# Patient Record
Sex: FEMALE | Race: WHITE | Employment: PART TIME | URBAN - METROPOLITAN AREA
[De-identification: names, ages, dates, MRNs, and addresses within clinical notes are randomized per-mention and may not be internally consistent; named-entity substitution may affect disease eponyms.]

---

## 2017-04-19 ENCOUNTER — HOSPITAL ENCOUNTER (OUTPATIENT)
Dept: PHYSICAL THERAPY | Age: 57
Discharge: HOME | End: 2017-04-19
Payer: COMMERCIAL

## 2017-04-19 DIAGNOSIS — M53.3 COCCYDYNIA: ICD-10-CM

## 2017-04-19 PROCEDURE — 97140 MANUAL THERAPY 1/> REGIONS: CPT

## 2017-04-19 PROCEDURE — 97161 PT EVAL LOW COMPLEX 20 MIN: CPT

## 2017-04-19 NOTE — PROGRESS NOTES
Ambulatory/Rehab Services H2 Model Falls Risk Assessment    Risk Factor Pts. ·   Confusion/Disorientation/Impulsivity  []    4 ·   Symptomatic Depression  []   2 ·   Altered Elimination  []   1 ·   Dizziness/Vertigo  []   1 ·   Gender (Male)  []   1 ·   Any administered antiepileptics (anticonvulsants):  []   2 ·   Any administered benzodiazepines:  []   1 ·   Visual Impairment (specify):  []   1 ·   Portable Oxygen Use  []   1 ·   Orthostatic ? BP  []   1 ·   History of Recent Falls (within 3 mos.)  []   5     Ability to Rise from Chair (choose one) Pts. ·   Ability to rise in a single movement  [x]   0 ·   Pushes up, successful in one attempt  []   1 ·   Multiple attempts, but successful  []   3 ·   Unable to rise without assistance  []   4   Total: (5 or greater = High Risk) 0     Falls Prevention Plan:   []                Physical Limitations to Exercise (specify):   []                Mobility Assistance Device (type):   []                Exercise/Equipment Adaptation (specify):    ©2010 [facility] of [place] Patent #[id number].  Federal Law prohibits the replication, distribution or use without written permission from [facility] [employer]

## 2017-04-19 NOTE — PROGRESS NOTES
Kvng Mendosa  : 1960 [facility] [place] [address], [place] [address].  [phone]   [phone]          OUTPATIENT PHYSICAL THERAPY:Initial Assessment 2017    ICD-10: Treatment Diagnosis: Sacrococcygeal disorders, not elsewhere classified Coccygodynia (M53.3)  Precautions/Allergies:   Review of patient's allergies indicates no known allergies. Fall Risk Score: 0 (? 5 = High Risk)  MD Orders: Eval and treat MEDICAL/REFERRING DIAGNOSIS:  Coccydynia [M53.3]   DATE OF ONSET: 10 months ago  REFERRING PHYSICIAN: Tray Gandhi MD  RETURN PHYSICIAN APPOINTMENT: TBD     INITIAL ASSESSMENT:  Ms. Aamir Diaz presents decreased ROM of right hip, increased tone on right hip and coccygeus, and deviated coccyx that may be contributing to patients coccyx pain. Pt would benefit from physical therapy to address these deficits and improving sitting, traveling, and sleeping. PROBLEM LIST (Impacting functional limitations):  1. Decreased Strength  2. Decreased ADL/Functional Activities  3. Decreased Transfer Abilities  4. Increased Pain  5. Decreased [place] with Home Exercise Program INTERVENTIONS PLANNED:  1. Neuromuscular re-education  2. Biofeedback as needed  3. HEP  4. Electrical Stimulation  5. Home Exercise Program (HEP)  6. Manual Therapy  7. Range of Motion (ROM)  8. Therapeutic Activites   9. Therapeutic Exercise   TREATMENT PLAN:  Effective Dates: 2017 TO 2017. Frequency/Duration: 1 time a week for 12 weeks  GOALS: (Goals have been discussed and agreed upon with patient.)  Short-Term Functional Goals: Time Frame: in 3 weeks  1. Patient will demonstrate independence with home exercise program.  2. Pt will increase right hip ER 5 degrees. Discharge Goals: Time Frame: in 8 weeks  1. Pt will report no pain at tailbone with transition from sit to stand.    2. Pt will report ability to sit for 1 hour in order to drive long distances. 3.  Pt will improve Modified Oswestry low back pain questionnaire to less than 3/50. Rehabilitation Potential For Stated Goals: Good  Regarding Andree Hooks's therapy, I certify that the treatment plan above will be carried out by a therapist or under their direction. Thank you for this referral,  Araceli Cruz PT     Referring Physician Signature: Carol Bowman MD              Date                    The information in this section was collected on 4/19/2017 (except where otherwise noted). HISTORY:   Present Symptoms:  Pain Intensity 1: 0 Pain = 2/10. Pain located at tailbone. History of Present Injury/Illness (Reason for Referral):  10 months ago was riding on a horse and had increased pain at the tailbone. Pain with sitting is about 3/10. Transition from sit to stand = 4/10. Has to change positions to improved sitting. Past Medical History/Comorbidities:   Ms. Loida Cast  has a past medical history of Anxiety and Headache. Ms. Loida Cast  has a past surgical history that includes hysterectomy. Social History/Living Environment:     Lives at home with . 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 Current Medications:    Current Outpatient Prescriptions:     estradiol (CLIMARA) 0.0375 mg/24 hr, 1 Patch by TransDERmal route Every Saturday. , Disp: 12 Patch, Rfl: 3    amitriptyline (ELAVIL) 25 mg tablet, Take 3 Tabs by mouth nightly., Disp: 90 Tab, Rfl: 11    citalopram (CELEXA) 20 mg tablet, Take 1 Tab by mouth daily. , Disp: 90 Tab, Rfl: 3    omega-3 fatty acids-vitamin e (FISH OIL) 1,000 mg cap, Take 1 capsule by mouth., Disp: , Rfl:     multivitamin (ONE A DAY) tablet, Take 1 tablet by mouth daily. , Disp: , Rfl:     Calcium Carbonate-Vit D3-Min (CALTRATE 600+D PLUS MINERALS) 600 mg calcium- 400 unit tab, Take  by mouth., Disp: , Rfl:    Date Last Reviewed:  4/19/2017  Bowel Habits:  Patient demonstrates normal bowel habits.   Mobility / Self Care: I        Number of Personal Factors/Comorbidities that affect the Plan of Care: 1-2: MODERATE COMPLEXITY   EXAMINATION:   Lumbar ROM:   All full without pain  Sacrum:  Sacral torsion to the left  Coccyx:  deviated to right  Right hip : limited with external rotation:  ER = 30 degrees        · Palpation:   Right Left   coccyx Pain  Pain    Coccygeus Increased tone    piriformis Increased tone    Psoas           Body Structures Involved:  1. Muscles  2. Ligaments Body Functions Affected:  1. Neuromusculoskeletal Activities and Participation Affected:  1. General Tasks and Demands  2. Domestic Life   Number of elements that affect the Plan of Care: 3: MODERATE COMPLEXITY   CLINICAL PRESENTATION:   Presentation: Stable and uncomplicated: LOW COMPLEXITY   CLINICAL DECISION MAKING:   Outcome Measure: Tool Used: Modified Oswestry Low Back Pain Questionnaire  Score:  Initial: 7/50  Most Recent: X/50 (Date: -- )   Interpretation of Score: Each section is scored on a 0-5 scale, 5 representing the greatest disability. The scores of each section are added together for a total score of 50. Score 0 1-10 11-20 21-30 31-40 41-49 50   Modifier CH CI CJ CK CL CM CN     Medical Necessity:   · Patient demonstrates good rehab potential due to higher previous functional level. · Skilled intervention continues to be required due to pain at coccyx imprairing ablitiy to sit. .  Reason for Services/Other Comments:  · Patient continues to require skilled intervention due to pain at coccyx imprairing ablitiy to sit. .   Use of outcome tool(s) and clinical judgement create a POC that gives a: Clear prediction of patient's progress: LOW COMPLEXITY   TREATMENT:   (In addition to Assessment/Re-Assessment sessions the following treatments were rendered)  Pre-treatment Symptoms/Complaints:  Coccyx pain in sitting and transitioning from sit to stand  Pain: Initial:   Pain Intensity 1: 0 0/10 Post Session:  0/10       THERAPEUTIC EXERCISE: (10 minutes):  Exercises per grid below to improve mobility, strength and coordination. Required minimal visual, verbal and tactile cues to promote proper body alignment and promote proper body breathing techniques. Progressed resistance, repetitions and complexity of movement as indicated. MANUAL THERAPY: (10  minutes): Joint mobilization and Soft tissue mobilization was utilized and necessary because of the patient's restricted joint motion and restricted motion of soft tissue. Soft tissue theile massage to bilateral coccygeus  Grade 2 mobilization of coccyx into flexion  McConnel taping for coccyx flexion    Exercises:  Patient instructed in pelvic floor exercises listed below:   Date:  4/19/2017 Date:   Date:     Activity/Exercise Parameters Parameters Parameters   Pt education 5 min      Sit to stand X 8     Piriformis stretch X 3 x 30 sec                                 The following educational topics were reviewed with patient:  Bladder health, tips to control urge, bladder diary, pelvic floor anatomy, how foods affect bladder, bladder retraining. Treatment/Session Assessment:    · Response to Treatment:   Pt reported good understanding of plan of care as well as exercises. All questions were answered and pt. Invited to call with any further questions or issues. No pain after treatment. · Compliance with Program/Exercises Will assess as treatment progresses. · Recommendations/Intent for next treatment session: \"Next visit will focus on advancements to more challenging activities\".   Total Treatment Duration:  PT Patient Time In/Time Out  Time In: 1100  Time Out: 1200    Syl Schaeffer PT

## 2017-04-25 ENCOUNTER — HOSPITAL ENCOUNTER (OUTPATIENT)
Dept: PHYSICAL THERAPY | Age: 57
Discharge: HOME | End: 2017-04-25
Payer: COMMERCIAL

## 2017-04-25 PROCEDURE — 97110 THERAPEUTIC EXERCISES: CPT

## 2017-04-25 PROCEDURE — 97140 MANUAL THERAPY 1/> REGIONS: CPT

## 2017-04-25 NOTE — PROGRESS NOTES
Fran Jacobs  : 1960 [facility] [place] [address], [address] [facility]  [phone]   [phone]          OUTPATIENT PHYSICAL THERAPY:Daily Note 2017    ICD-10: Treatment Diagnosis: Sacrococcygeal disorders, not elsewhere classified Coccygodynia (M53.3)  Precautions/Allergies:   Review of patient's allergies indicates no known allergies. Fall Risk Score: 0 (? 5 = High Risk)  MD Orders: Eval and treat MEDICAL/REFERRING DIAGNOSIS:  Coccydynia [M53.3]   DATE OF ONSET: 10 months ago  REFERRING PHYSICIAN: Daniel Wilson MD  RETURN PHYSICIAN APPOINTMENT: TBD     INITIAL ASSESSMENT:  Ms. Gina Arizmendi presents decreased ROM of right hip, increased tone on right hip and coccygeus, and deviated coccyx that may be contributing to patients coccyx pain. Pt would benefit from physical therapy to address these deficits and improving sitting, traveling, and sleeping. PROBLEM LIST (Impacting functional limitations):  1. Decreased Strength  2. Decreased ADL/Functional Activities  3. Decreased Transfer Abilities  4. Increased Pain  5. Decreased [place] with Home Exercise Program INTERVENTIONS PLANNED:  1. Neuromuscular re-education  2. Biofeedback as needed  3. HEP  4. Electrical Stimulation  5. Home Exercise Program (HEP)  6. Manual Therapy  7. Range of Motion (ROM)  8. Therapeutic Activites   9. Therapeutic Exercise   TREATMENT PLAN:  Effective Dates: 2017 TO 2017. Frequency/Duration: 1 time a week for 12 weeks  GOALS: (Goals have been discussed and agreed upon with patient.)  Short-Term Functional Goals: Time Frame: in 3 weeks  1. Patient will demonstrate independence with home exercise program.  2. Pt will increase right hip ER 5 degrees. Discharge Goals: Time Frame: in 8 weeks  1. Pt will report no pain at tailbone with transition from sit to stand. 2. Pt will report ability to sit for 1 hour in order to drive long distances.   3.  Pt will improve Modified Oswestry low back pain questionnaire to less than 3/50. Rehabilitation Potential For Stated Goals: Good  Regarding Andree Hooks's therapy, I certify that the treatment plan above will be carried out by a therapist or under their direction. Thank you for this referral,  Pastor Jhaveri PT     Referring Physician Signature: Kim Campbell MD              Date                    The information in this section was collected on 4/19/2017 (except where otherwise noted). HISTORY:   Present Symptoms:  Pain Intensity 1: 0 Pain = 2/10. Pain located at tailbone. History of Present Injury/Illness (Reason for Referral):  10 months ago was riding on a horse and had increased pain at the tailbone. Pain with sitting is about 3/10. Transition from sit to stand = 4/10. Has to change positions to improved sitting. Past Medical History/Comorbidities:   Ms. Andree Epstein  has a past medical history of Anxiety and Headache. Ms. Andree Epstein  has a past surgical history that includes hysterectomy. Social History/Living Environment:     Lives at home with . 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 Current Medications:    Current Outpatient Prescriptions:     estradiol (CLIMARA) 0.0375 mg/24 hr, 1 Patch by TransDERmal route Every Saturday. , Disp: 12 Patch, Rfl: 3    amitriptyline (ELAVIL) 25 mg tablet, Take 3 Tabs by mouth nightly., Disp: 90 Tab, Rfl: 11    citalopram (CELEXA) 20 mg tablet, Take 1 Tab by mouth daily. , Disp: 90 Tab, Rfl: 3    omega-3 fatty acids-vitamin e (FISH OIL) 1,000 mg cap, Take 1 capsule by mouth., Disp: , Rfl:     multivitamin (ONE A DAY) tablet, Take 1 tablet by mouth daily. , Disp: , Rfl:     Calcium Carbonate-Vit D3-Min (CALTRATE 600+D PLUS MINERALS) 600 mg calcium- 400 unit tab, Take  by mouth., Disp: , Rfl:    Date Last Reviewed:  4/25/2017  Bowel Habits:  Patient demonstrates normal bowel habits. Mobility / Self Care:  I Number of Personal Factors/Comorbidities that affect the Plan of Care: 1-2: MODERATE COMPLEXITY   EXAMINATION:   Lumbar ROM:   All full without pain  Sacrum:  Sacral torsion to the left  Coccyx:  deviated to right  Right hip : limited with external rotation:  ER = 30 degrees        · Palpation:   Right Left   coccyx Pain  Pain    Coccygeus Increased tone    piriformis Increased tone    Psoas           Body Structures Involved:  1. Muscles  2. Ligaments Body Functions Affected:  1. Neuromusculoskeletal Activities and Participation Affected:  1. General Tasks and Demands  2. Domestic Life   Number of elements that affect the Plan of Care: 3: MODERATE COMPLEXITY   CLINICAL PRESENTATION:   Presentation: Stable and uncomplicated: LOW COMPLEXITY   CLINICAL DECISION MAKING:   Outcome Measure: Tool Used: Modified Oswestry Low Back Pain Questionnaire  Score:  Initial: 7/50  Most Recent: X/50 (Date: -- )   Interpretation of Score: Each section is scored on a 0-5 scale, 5 representing the greatest disability. The scores of each section are added together for a total score of 50. Score 0 1-10 11-20 21-30 31-40 41-49 50   Modifier CH CI CJ CK CL CM CN     Medical Necessity:   · Patient demonstrates good rehab potential due to higher previous functional level. · Skilled intervention continues to be required due to pain at coccyx imprairing ablitiy to sit. .  Reason for Services/Other Comments:  · Patient continues to require skilled intervention due to pain at coccyx imprairing ablitiy to sit. .   Use of outcome tool(s) and clinical judgement create a POC that gives a: Clear prediction of patient's progress: LOW COMPLEXITY   TREATMENT:   (In addition to Assessment/Re-Assessment sessions the following treatments were rendered)  Pre-treatment Symptoms/Complaints:  Has noticed improvements with going from sit to stand. Coccyx is about the same.    Pain: Initial:   Pain Intensity 1: 0 0/10 Post Session:  0/10       THERAPEUTIC EXERCISE: (10 minutes):  Exercises per grid below to improve mobility, strength and coordination. Required minimal visual, verbal and tactile cues to promote proper body alignment and promote proper body breathing techniques. Progressed resistance, repetitions and complexity of movement as indicated. MANUAL THERAPY: (40 minutes): Joint mobilization and Soft tissue mobilization was utilized and necessary because of the patient's restricted joint motion and restricted motion of soft tissue. Soft tissue theile massage to bilateral coccygeus  Grade 2 mobilization of coccyx into flexion  McConnel taping for coccyx flexion    Exercises:  Patient instructed in pelvic floor exercises listed below:   Date:  4/19/2017 Date:  4/25/2017   Activity/Exercise Parameters Parameters   Pt education 5 min     Sit to stand X 8    Piriformis stretch X 3 x 30 sec X 3 x 30 sec   Prone heel squeezes  X 8 x 10 sec hold   Single leg extension in prone  X 10 B   Clam shell  X 10 B            The following educational topics were reviewed with patient:    Treatment/Session Assessment:  Pt did not report increased pain after treatment today. Did well with all exercises,   · Response to Treatment:  Good  · Compliance with Program/Exercises Will assess as treatment progresses. · Recommendations/Intent for next treatment session: \"Next visit will focus on advancements to more challenging activities\".   Total Treatment Duration:  PT Patient Time In/Time Out  Time In: 1000  Time Out: [address], PT                 \

## 2017-05-09 ENCOUNTER — HOSPITAL ENCOUNTER (OUTPATIENT)
Dept: PHYSICAL THERAPY | Age: 57
Discharge: HOME | End: 2017-05-09
Payer: COMMERCIAL

## 2017-05-09 PROCEDURE — 97140 MANUAL THERAPY 1/> REGIONS: CPT

## 2017-05-09 PROCEDURE — 97110 THERAPEUTIC EXERCISES: CPT

## 2017-05-09 NOTE — PROGRESS NOTES
Maral Lewis  : 1960 [facility]  [address], [address] [place] [address], [address] [facility]  [phone]   [phone]          OUTPATIENT PHYSICAL THERAPY:Daily Note 2017    ICD-10: Treatment Diagnosis: Sacrococcygeal disorders, not elsewhere classified Coccygodynia (M53.3)  Precautions/Allergies:   Review of patient's allergies indicates no known allergies. Fall Risk Score: 0 (? 5 = High Risk)  MD Orders: Eval and treat MEDICAL/REFERRING DIAGNOSIS:  Coccydynia [M53.3]   DATE OF ONSET: 10 months ago  REFERRING PHYSICIAN: Robbin Moody MD  RETURN PHYSICIAN APPOINTMENT: TBD     INITIAL ASSESSMENT:  Ms. Zafar Bo presents decreased ROM of right hip, increased tone on right hip and coccygeus, and deviated coccyx that may be contributing to patients coccyx pain. Pt would benefit from physical therapy to address these deficits and improving sitting, traveling, and sleeping. PROBLEM LIST (Impacting functional limitations):  1. Decreased Strength  2. Decreased ADL/Functional Activities  3. Decreased Transfer Abilities  4. Increased Pain  5. Decreased [place] with Home Exercise Program INTERVENTIONS PLANNED:  1. Neuromuscular re-education  2. Biofeedback as needed  3. HEP  4. Electrical Stimulation  5. Home Exercise Program (HEP)  6. Manual Therapy  7. Range of Motion (ROM)  8. Therapeutic Activites   9. Therapeutic Exercise   TREATMENT PLAN:  Effective Dates: 2017 TO 2017. Frequency/Duration: 1 time a week for 12 weeks  GOALS: (Goals have been discussed and agreed upon with patient.)  Short-Term Functional Goals: Time Frame: in 3 weeks  1. Patient will demonstrate independence with home exercise program.  2. Pt will increase right hip ER 5 degrees. Discharge Goals: Time Frame: in 8 weeks  1. Pt will report no pain at tailbone with transition from sit to stand. 2. Pt will report ability to sit for 1 hour in order to drive long distances.   3.  Pt will improve Modified Oswestry low back pain questionnaire to less than 3/50. Rehabilitation Potential For Stated Goals: Good  Regarding Andree Hooks's therapy, I certify that the treatment plan above will be carried out by a therapist or under their direction. Thank you for this referral,  Lexus Schaefer PT     Referring Physician Signature: Jasmin Wang MD              Date                    The information in this section was collected on 4/19/2017 (except where otherwise noted). HISTORY:   Present Symptoms:  Pain Intensity 1: 0 Pain = 2/10. Pain located at tailbone. History of Present Injury/Illness (Reason for Referral):  10 months ago was riding on a horse and had increased pain at the tailbone. Pain with sitting is about 3/10. Transition from sit to stand = 4/10. Has to change positions to improved sitting. Past Medical History/Comorbidities:   Ms. Mayra Stanley  has a past medical history of Anxiety and Headache. Ms. Mayra Stanley  has a past surgical history that includes hysterectomy. Social History/Living Environment:     Lives at home with . 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 Current Medications:    Current Outpatient Prescriptions:     estradiol (CLIMARA) 0.0375 mg/24 hr, 1 Patch by TransDERmal route Every Saturday. , Disp: 12 Patch, Rfl: 3    amitriptyline (ELAVIL) 25 mg tablet, Take 3 Tabs by mouth nightly., Disp: 90 Tab, Rfl: 11    citalopram (CELEXA) 20 mg tablet, Take 1 Tab by mouth daily. , Disp: 90 Tab, Rfl: 3    omega-3 fatty acids-vitamin e (FISH OIL) 1,000 mg cap, Take 1 capsule by mouth., Disp: , Rfl:     multivitamin (ONE A DAY) tablet, Take 1 tablet by mouth daily. , Disp: , Rfl:     Calcium Carbonate-Vit D3-Min (CALTRATE 600+D PLUS MINERALS) 600 mg calcium- 400 unit tab, Take  by mouth., Disp: , Rfl:    Date Last Reviewed:  5/9/2017  Bowel Habits:  Patient demonstrates normal bowel habits. Mobility / Self Care:  I Number of Personal Factors/Comorbidities that affect the Plan of Care: 1-2: MODERATE COMPLEXITY   EXAMINATION:   Lumbar ROM:   All full without pain  Sacrum:  Sacral torsion to the left  Coccyx:  deviated to right  Right hip : limited with external rotation:  ER = 30 degrees        · Palpation:   Right Left   coccyx Pain  Pain    Coccygeus Increased tone    piriformis Increased tone    Psoas     Levator ani tenderness Pain    coccygues  Pain               Body Structures Involved:  1. Muscles  2. Ligaments Body Functions Affected:  1. Neuromusculoskeletal Activities and Participation Affected:  1. General Tasks and Demands  2. Domestic Life   Number of elements that affect the Plan of Care: 3: MODERATE COMPLEXITY   CLINICAL PRESENTATION:   Presentation: Stable and uncomplicated: LOW COMPLEXITY   CLINICAL DECISION MAKING:   Outcome Measure: Tool Used: Modified Oswestry Low Back Pain Questionnaire  Score:  Initial: 7/50  Most Recent: X/50 (Date: -- )   Interpretation of Score: Each section is scored on a 0-5 scale, 5 representing the greatest disability. The scores of each section are added together for a total score of 50. Score 0 1-10 11-20 21-30 31-40 41-49 50   Modifier CH CI CJ CK CL CM CN     Medical Necessity:   · Patient demonstrates good rehab potential due to higher previous functional level. · Skilled intervention continues to be required due to pain at coccyx imprairing ablitiy to sit. .  Reason for Services/Other Comments:  · Patient continues to require skilled intervention due to pain at coccyx imprairing ablitiy to sit. .   Use of outcome tool(s) and clinical judgement create a POC that gives a: Clear prediction of patient's progress: LOW COMPLEXITY   TREATMENT:   (In addition to Assessment/Re-Assessment sessions the following treatments were rendered)  Pre-treatment Symptoms/Complaints: Pain at the coccyx with slumped sitting. Mobility in the right hip is improving.    Pain: Initial:   Pain Intensity 1: 0 /10 Post Session:  0/10       THERAPEUTIC EXERCISE: (8 minutes):  Exercises per grid below to improve mobility, strength and coordination. Required minimal visual, verbal and tactile cues to promote proper body alignment and promote proper body breathing techniques. Progressed resistance, repetitions and complexity of movement as indicated. MANUAL THERAPY: (40 minutes): Joint mobilization and Soft tissue mobilization was utilized and necessary because of the patient's restricted joint motion and restricted motion of soft tissue. Soft tissue theile massage to bilateral coccygeus  Grade 2 mobilization of coccyx into flexion  McConnel taping for coccyx flexion    Exercises:  Patient instructed in pelvic floor exercises listed below:   Date:  4/19/2017 Date:  4/25/2017 Date:  5/9/2017   Activity/Exercise Parameters Parameters    Pt education 5 min      Sit to stand X 8     Piriformis stretch X 3 x 30 sec X 3 x 30 sec X 3 x 30 sec   Prone heel squeezes  X 8 x 10 sec hold    Single leg extension in prone  X 10 B    Clam shell  X 10 B    squats   X 10                          The following educational topics were reviewed with patient:    Treatment/Session Assessment:  Pt states no change in symptoms when slump sitting. No pain sitting with good posture. Does present with pelvic floor dysfunction. · Response to Treatment:  Good  · Compliance with Program/Exercises Will assess as treatment progresses. · Recommendations/Intent for next treatment session: \"Next visit will focus on advancements to more challenging activities\".   Total Treatment Duration:  PT Patient Time In/Time Out  Time In: 1330  Time Out: 1430    Renay Suarez, PT

## 2017-05-16 ENCOUNTER — HOSPITAL ENCOUNTER (OUTPATIENT)
Dept: PHYSICAL THERAPY | Age: 57
Discharge: HOME | End: 2017-05-16
Payer: COMMERCIAL

## 2017-05-16 PROCEDURE — 97140 MANUAL THERAPY 1/> REGIONS: CPT

## 2017-05-16 PROCEDURE — 97110 THERAPEUTIC EXERCISES: CPT

## 2017-05-16 NOTE — PROGRESS NOTES
Chika Heath  : 1960 [facility] at [address], [address] [place] [address],[address], [place] [address].  [phone]   [phone]          OUTPATIENT PHYSICAL THERAPY:Daily Note 2017    ICD-10: Treatment Diagnosis: Sacrococcygeal disorders, not elsewhere classified Coccygodynia (M53.3)  Precautions/Allergies:   Review of patient's allergies indicates no known allergies. Fall Risk Score: 0 (? 5 = High Risk)  MD Orders: Eval and treat MEDICAL/REFERRING DIAGNOSIS:  Coccydynia [M53.3]   DATE OF ONSET: 10 months ago  REFERRING PHYSICIAN: Megan Valdivia MD  RETURN PHYSICIAN APPOINTMENT: TBD     INITIAL ASSESSMENT:  Ms. Kevin Clayton presents decreased ROM of right hip, increased tone on right hip and coccygeus, and deviated coccyx that may be contributing to patients coccyx pain. Pt would benefit from physical therapy to address these deficits and improving sitting, traveling, and sleeping. PROBLEM LIST (Impacting functional limitations):  1. Decreased Strength  2. Decreased ADL/Functional Activities  3. Decreased Transfer Abilities  4. Increased Pain  5. Decreased [place] with Home Exercise Program INTERVENTIONS PLANNED:  1. Neuromuscular re-education  2. Biofeedback as needed  3. HEP  4. Electrical Stimulation  5. Home Exercise Program (HEP)  6. Manual Therapy  7. Range of Motion (ROM)  8. Therapeutic Activites   9. Therapeutic Exercise   TREATMENT PLAN:  Effective Dates: 2017 TO 2017. Frequency/Duration: 1 time a week for 12 weeks  GOALS: (Goals have been discussed and agreed upon with patient.)  Short-Term Functional Goals: Time Frame: in 3 weeks  1. Patient will demonstrate independence with home exercise program.  2. Pt will increase right hip ER 5 degrees. Discharge Goals: Time Frame: in 8 weeks  1. Pt will report no pain at tailbone with transition from sit to stand. 2. Pt will report ability to sit for 1 hour in order to drive long distances.   3.  Pt will improve Modified Oswestry low back pain questionnaire to less than 3/50. Rehabilitation Potential For Stated Goals: Good  Regarding Andree Hooks's therapy, I certify that the treatment plan above will be carried out by a therapist or under their direction. Thank you for this referral,  Renay Suarez PT     Referring Physician Signature: Sylvain Donaldson MD              Date                    The information in this section was collected on 4/19/2017 (except where otherwise noted). HISTORY:   Present Symptoms:  Pain Intensity 1: 0 Pain = 2/10. Pain located at tailbone. History of Present Injury/Illness (Reason for Referral):  10 months ago was riding on a horse and had increased pain at the tailbone. Pain with sitting is about 3/10. Transition from sit to stand = 4/10. Has to change positions to improved sitting. Past Medical History/Comorbidities:   Ms. Jade Albright  has a past medical history of Anxiety and Headache. Ms. Jade Albright  has a past surgical history that includes hysterectomy. Social History/Living Environment:     Lives at home with . 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 Current Medications:    Current Outpatient Prescriptions:     estradiol (CLIMARA) 0.0375 mg/24 hr, 1 Patch by TransDERmal route Every Saturday. , Disp: 12 Patch, Rfl: 3    amitriptyline (ELAVIL) 25 mg tablet, Take 3 Tabs by mouth nightly., Disp: 90 Tab, Rfl: 11    citalopram (CELEXA) 20 mg tablet, Take 1 Tab by mouth daily. , Disp: 90 Tab, Rfl: 3    omega-3 fatty acids-vitamin e (FISH OIL) 1,000 mg cap, Take 1 capsule by mouth., Disp: , Rfl:     multivitamin (ONE A DAY) tablet, Take 1 tablet by mouth daily. , Disp: , Rfl:     Calcium Carbonate-Vit D3-Min (CALTRATE 600+D PLUS MINERALS) 600 mg calcium- 400 unit tab, Take  by mouth., Disp: , Rfl:    Date Last Reviewed:  5/16/2017  Bowel Habits:  Patient demonstrates normal bowel habits. Mobility / Self Care:  I Number of Personal Factors/Comorbidities that affect the Plan of Care: 1-2: MODERATE COMPLEXITY   EXAMINATION:   Lumbar ROM:   All full without pain  Sacrum:  Sacral torsion to the left  Coccyx:  deviated to right  Right hip : limited with external rotation:  ER = 30 degrees        · Palpation:   Right Left   coccyx Pain  Pain    Coccygeus Increased tone    piriformis Increased tone    Psoas     Levator ani tenderness Pain    coccygues  Pain               Body Structures Involved:  1. Muscles  2. Ligaments Body Functions Affected:  1. Neuromusculoskeletal Activities and Participation Affected:  1. General Tasks and Demands  2. Domestic Life   Number of elements that affect the Plan of Care: 3: MODERATE COMPLEXITY   CLINICAL PRESENTATION:   Presentation: Stable and uncomplicated: LOW COMPLEXITY   CLINICAL DECISION MAKING:   Outcome Measure: Tool Used: Modified Oswestry Low Back Pain Questionnaire  Score:  Initial: 7/50  Most Recent: X/50 (Date: -- )   Interpretation of Score: Each section is scored on a 0-5 scale, 5 representing the greatest disability. The scores of each section are added together for a total score of 50. Score 0 1-10 11-20 21-30 31-40 41-49 50   Modifier CH CI CJ CK CL CM CN     Medical Necessity:   · Patient demonstrates good rehab potential due to higher previous functional level. · Skilled intervention continues to be required due to pain at coccyx imprairing ablitiy to sit. .  Reason for Services/Other Comments:  · Patient continues to require skilled intervention due to pain at coccyx imprairing ablitiy to sit. .   Use of outcome tool(s) and clinical judgement create a POC that gives a: Clear prediction of patient's progress: LOW COMPLEXITY   TREATMENT:   (In addition to Assessment/Re-Assessment sessions the following treatments were rendered)  Pre-treatment Symptoms/Complaints: Pain at the coccyx with slumped sitting continues. Difficulty with driving.   Mobility in the right hip is improving. Pain: Initial:   Pain Intensity 1: 0 /10 Post Session:  0/10       THERAPEUTIC EXERCISE: (25 minutes):  Exercises per grid below to improve mobility, strength and coordination. Required minimal visual, verbal and tactile cues to promote proper body alignment and promote proper body breathing techniques. Progressed resistance, repetitions and complexity of movement as indicated. MANUAL THERAPY: (20 minutes): Joint mobilization and Soft tissue mobilization was utilized and necessary because of the patient's restricted joint motion and restricted motion of soft tissue. SCS and Trigger point to bilateral LA, OI, and perineum to reduce tone and improve tissue elasticity. Soft tissue external to lateral sacral border and       Exercises:  Patient instructed in pelvic floor exercises listed below:   Date:  4/19/2017 Date:  4/25/2017 Date:  5/9/2017 Date:  5/16/2017   Activity/Exercise Parameters Parameters     Pt education 5 min       Sit to stand X 8      Piriformis stretch X 3 x 30 sec X 3 x 30 sec X 3 x 30 sec    Prone heel squeezes  X 8 x 10 sec hold     Single leg extension in prone  X 10 B  In standing x 20 with GTB   Clam shell  X 10 B     squats   X 10     Squats with ball    X 10 with 5 sec hold   Pelvic floor drops    X 10 x 2   TA set    In standing and standing x 10 each   Sidestepping with red theraband in squat position     X 40 feet x 10                     The following educational topics were reviewed with patient:    Treatment/Session Assessment:  Pt did well with exercises today  · Response to Treatment:  Good  · Compliance with Program/Exercises Will assess as treatment progresses. · Recommendations/Intent for next treatment session: \"Next visit will focus on advancements to more challenging activities\".   Add core exercises to promote posterior stabilization  Total Treatment Duration:  PT Patient Time In/Time Out  Time In: 1330  Time Out: 1430    Melchor Medina, PT General Good

## 2017-05-23 ENCOUNTER — HOSPITAL ENCOUNTER (OUTPATIENT)
Dept: PHYSICAL THERAPY | Age: 57
Discharge: HOME | End: 2017-05-23
Payer: COMMERCIAL

## 2017-05-23 PROCEDURE — 97110 THERAPEUTIC EXERCISES: CPT

## 2017-05-23 NOTE — PROGRESS NOTES
Agustín Chavira  : 1960 [facility] at [place] [address] [facility], [address] [place] [address],[address], [facility].  [phone]   [phone]          OUTPATIENT PHYSICAL THERAPY:Daily Note 2017    ICD-10: Treatment Diagnosis: Sacrococcygeal disorders, not elsewhere classified Coccygodynia (M53.3)  Precautions/Allergies:   Review of patient's allergies indicates no known allergies. Fall Risk Score: 0 (? 5 = High Risk)  MD Orders: Eval and treat MEDICAL/REFERRING DIAGNOSIS:  Coccydynia [M53.3]   DATE OF ONSET: 10 months ago  REFERRING PHYSICIAN: Shekhar Nagel MD  RETURN PHYSICIAN APPOINTMENT: TBD     INITIAL ASSESSMENT:  Ms. Leonardo Saez presents decreased ROM of right hip, increased tone on right hip and coccygeus, and deviated coccyx that may be contributing to patients coccyx pain. Pt would benefit from physical therapy to address these deficits and improving sitting, traveling, and sleeping. PROBLEM LIST (Impacting functional limitations):  1. Decreased Strength  2. Decreased ADL/Functional Activities  3. Decreased Transfer Abilities  4. Increased Pain  5. Decreased [place] with Home Exercise Program INTERVENTIONS PLANNED:  1. Neuromuscular re-education  2. Biofeedback as needed  3. HEP  4. Electrical Stimulation  5. Home Exercise Program (HEP)  6. Manual Therapy  7. Range of Motion (ROM)  8. Therapeutic Activites   9. Therapeutic Exercise   TREATMENT PLAN:  Effective Dates: 2017 TO 2017. Frequency/Duration: 1 time a week for 12 weeks  GOALS: (Goals have been discussed and agreed upon with patient.)  Short-Term Functional Goals: Time Frame: in 3 weeks  1. Patient will demonstrate independence with home exercise program.  2. Pt will increase right hip ER 5 degrees. Discharge Goals: Time Frame: in 8 weeks  1. Pt will report no pain at tailbone with transition from sit to stand. 2. Pt will report ability to sit for 1 hour in order to drive long distances.   3.  Pt will improve Modified Oswestry low back pain questionnaire to less than 3/50. Rehabilitation Potential For Stated Goals: Good  Regarding Andree Hooks's therapy, I certify that the treatment plan above will be carried out by a therapist or under their direction. Thank you for this referral,  Mily Linares PT     Referring Physician Signature: Smitha Watts MD              Date                    The information in this section was collected on 4/19/2017 (except where otherwise noted). HISTORY:   Present Symptoms:  Pain Intensity 1: 0 Pain = 2/10. Pain located at tailbone. History of Present Injury/Illness (Reason for Referral):  10 months ago was riding on a horse and had increased pain at the tailbone. Pain with sitting is about 3/10. Transition from sit to stand = 4/10. Has to change positions to improved sitting. Past Medical History/Comorbidities:   Ms. Elia Us  has a past medical history of Anxiety and Headache. Ms. Elia Us  has a past surgical history that includes hysterectomy. Social History/Living Environment:     Lives at home with . 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 Current Medications:    Current Outpatient Prescriptions:     estradiol (CLIMARA) 0.0375 mg/24 hr, 1 Patch by TransDERmal route Every Saturday. , Disp: 12 Patch, Rfl: 3    amitriptyline (ELAVIL) 25 mg tablet, Take 3 Tabs by mouth nightly., Disp: 90 Tab, Rfl: 11    citalopram (CELEXA) 20 mg tablet, Take 1 Tab by mouth daily. , Disp: 90 Tab, Rfl: 3    omega-3 fatty acids-vitamin e (FISH OIL) 1,000 mg cap, Take 1 capsule by mouth., Disp: , Rfl:     multivitamin (ONE A DAY) tablet, Take 1 tablet by mouth daily. , Disp: , Rfl:     Calcium Carbonate-Vit D3-Min (CALTRATE 600+D PLUS MINERALS) 600 mg calcium- 400 unit tab, Take  by mouth., Disp: , Rfl:    Date Last Reviewed:  5/23/2017  Bowel Habits:  Patient demonstrates normal bowel habits. Mobility / Self Care:  I Number of Personal Factors/Comorbidities that affect the Plan of Care: 1-2: MODERATE COMPLEXITY   EXAMINATION:   Lumbar ROM:   All full without pain  Sacrum:  Sacral torsion to the left  Coccyx:  deviated to right  Right hip : limited with external rotation:  ER = 30 degrees        · Palpation:   Right Left   coccyx Pain  Pain    Coccygeus Increased tone    piriformis Increased tone    Psoas     Levator ani tenderness Pain    coccygues  Pain               Body Structures Involved:  1. Muscles  2. Ligaments Body Functions Affected:  1. Neuromusculoskeletal Activities and Participation Affected:  1. General Tasks and Demands  2. Domestic Life   Number of elements that affect the Plan of Care: 3: MODERATE COMPLEXITY   CLINICAL PRESENTATION:   Presentation: Stable and uncomplicated: LOW COMPLEXITY   CLINICAL DECISION MAKING:   Outcome Measure: Tool Used: Modified Oswestry Low Back Pain Questionnaire  Score:  Initial: 7/50  Most Recent: X/50 (Date: -- )   Interpretation of Score: Each section is scored on a 0-5 scale, 5 representing the greatest disability. The scores of each section are added together for a total score of 50. Score 0 1-10 11-20 21-30 31-40 41-49 50   Modifier CH CI CJ CK CL CM CN     Medical Necessity:   · Patient demonstrates good rehab potential due to higher previous functional level. · Skilled intervention continues to be required due to pain at coccyx imprairing ablitiy to sit. .  Reason for Services/Other Comments:  · Patient continues to require skilled intervention due to pain at coccyx imprairing ablitiy to sit. .   Use of outcome tool(s) and clinical judgement create a POC that gives a: Clear prediction of patient's progress: LOW COMPLEXITY   TREATMENT:   (In addition to Assessment/Re-Assessment sessions the following treatments were rendered)  Pre-treatment Symptoms/Complaints: No change in symptoms.    Pain: Initial:   Pain Intensity 1: 0 /10 Post Session:  0/10       THERAPEUTIC EXERCISE: (30 minutes):  Exercises per grid below to improve mobility, strength and coordination. Required minimal visual, verbal and tactile cues to promote proper body alignment and promote proper body breathing techniques. Progressed resistance, repetitions and complexity of movement as indicated. MANUAL THERAPY: (0 minutes): Joint mobilization and Soft tissue mobilization was utilized and necessary because of the patient's restricted joint motion and restricted motion of soft tissue. Exercises:  Patient instructed in pelvic floor exercises listed below:   Date:  4/19/2017 Date:  4/25/2017 Date:  5/9/2017 Date:  5/16/2017 Date:  5/23/2017   Activity/Exercise Parameters Parameters      Pt education 5 min     10 min   Sit to stand X 8       Piriformis stretch X 3 x 30 sec X 3 x 30 sec X 3 x 30 sec     Prone heel squeezes  X 8 x 10 sec hold      Single leg extension in prone  X 10 B  In standing x 20 with GTB    Clam shell  X 10 B      squats   X 10      Squats with ball    X 10 with 5 sec hold    Pelvic floor drops    X 10 x 2    TA set    In standing and standing x 10 each In supine x 10    Sidestepping with red theraband in squat position     X 40 feet x 10    Plank     On knees x 15 sec x 3   4 point alt arms and legs     60 sec   Cobra     2 min   4 point alternating legs with knee bent for hamstring     X 10 B       The following educational topics were reviewed with patient:  HEP  Treatment/Session Assessment:  Reports good understanding of HEP. If no change over the next two weeks will plan on calling M.D. And D/C pt as no improvements seen. · Response to Treatment:  Good  · Compliance with Program/Exercises Will assess as treatment progresses. · Recommendations/Intent for next treatment session: \"Next visit will focus on advancements to more challenging activities\".   Add core exercises to promote posterior stabilization  Total Treatment Duration:  PT Patient Time In/Time Out  Time In: [address] [facility]  Time Out: [address] [facility] Colonel Araceli PT                 j

## 2017-06-13 NOTE — PROGRESS NOTES
Casper Giang  : 1960 [facility], [address] [place] [address],[address], [place] [address].  [phone]   [phone]          OUTPATIENT PHYSICAL THERAPY:Discontinuation Summary 2017    ICD-10: Treatment Diagnosis: Sacrococcygeal disorders, not elsewhere classified Coccygodynia (M53.3)  Precautions/Allergies:   Review of patient's allergies indicates no known allergies. Fall Risk Score: 0 (? 5 = High Risk)  MD Orders: Eval and treat MEDICAL/REFERRING DIAGNOSIS:  Coccydynia [M53.3]   DATE OF ONSET: 10 months ago  REFERRING PHYSICIAN: Meir Barrios MD  RETURN PHYSICIAN APPOINTMENT: TBD     ASSESSMENT:  Ms. Tushar Cifuentes has been seen for 5 physical therapy visits. She has not improved with therapy. At this point, I do not feel that physical therapy will benefit her any further. I have called Dr. Duarte Span office and relayed her current status. I suggested to Mrs. Tushar Cifuentes that she follow up with MD as her symptoms or function have not changed.                     Christiane Leblanc

## 2017-10-19 ENCOUNTER — APPOINTMENT (OUTPATIENT)
Dept: GENERAL RADIOLOGY | Age: 57
End: 2017-10-19
Attending: PHYSICAL MEDICINE & REHABILITATION
Payer: COMMERCIAL

## 2017-10-19 ENCOUNTER — HOSPITAL ENCOUNTER (OUTPATIENT)
Age: 57
Setting detail: OUTPATIENT SURGERY
Discharge: HOME | End: 2017-10-19
Attending: PHYSICAL MEDICINE & REHABILITATION | Admitting: PHYSICAL MEDICINE & REHABILITATION
Payer: COMMERCIAL

## 2017-10-19 VITALS
HEART RATE: 70 BPM | RESPIRATION RATE: 16 BRPM | BODY MASS INDEX: 21.9 KG/M2 | HEIGHT: 62 IN | SYSTOLIC BLOOD PRESSURE: 138 MMHG | WEIGHT: 119 LBS | TEMPERATURE: 98.9 F | DIASTOLIC BLOOD PRESSURE: 89 MMHG | OXYGEN SATURATION: 99 %

## 2017-10-19 PROCEDURE — 76210000020 HC REC RM PH II FIRST 0.5 HR: Performed by: PHYSICAL MEDICINE & REHABILITATION

## 2017-10-19 PROCEDURE — 74011000250 HC RX REV CODE- 250: Performed by: PHYSICAL MEDICINE & REHABILITATION

## 2017-10-19 PROCEDURE — 74011250636 HC RX REV CODE- 250/636: Performed by: PHYSICAL MEDICINE & REHABILITATION

## 2017-10-19 PROCEDURE — 77030003666 HC NDL SPINAL BD -A: Performed by: PHYSICAL MEDICINE & REHABILITATION

## 2017-10-19 PROCEDURE — 74011636320 HC RX REV CODE- 636/320: Performed by: PHYSICAL MEDICINE & REHABILITATION

## 2017-10-19 PROCEDURE — 77030014125 HC TY EPDRL BBMI -B: Performed by: PHYSICAL MEDICINE & REHABILITATION

## 2017-10-19 PROCEDURE — 76010000093 HC SPECIAL PROCEDURE: Performed by: PHYSICAL MEDICINE & REHABILITATION

## 2017-10-19 RX ORDER — METHYLPREDNISOLONE ACETATE 40 MG/ML
INJECTION, SUSPENSION INTRA-ARTICULAR; INTRALESIONAL; INTRAMUSCULAR; SOFT TISSUE AS NEEDED
Status: DISCONTINUED | OUTPATIENT
Start: 2017-10-19 | End: 2017-10-19 | Stop reason: HOSPADM

## 2017-10-19 RX ORDER — LIDOCAINE HYDROCHLORIDE 10 MG/ML
INJECTION INFILTRATION; PERINEURAL AS NEEDED
Status: DISCONTINUED | OUTPATIENT
Start: 2017-10-19 | End: 2017-10-19 | Stop reason: HOSPADM

## 2017-10-19 NOTE — IP AVS SNAPSHOT
Lila Joseph 
 
 
 [address] [facility] [address] [facility] 
[phone] Patient: Bishop Morales MRN: [id number] :1960 You are allergic to the following No active allergies Recent Documentation Height Weight BMI OB Status Smoking Status 1.575 m 54 kg 21.77 kg/m2 Hysterectomy Never Smoker Emergency Contacts Name Discharge Info Relation Home Work Mobile Carson Hugo  Spouse [3] [phone] About your hospitalization You were admitted on:  2017 You last received care in the:  [facility] OP PACU You were discharged on:  2017 Unit phone number:  [phone] Why you were hospitalized Your primary diagnosis was:  Not on File Providers Seen During Your Hospitalizations Provider Role Specialty Primary office phone Prachi Young MD Attending Provider Physical Medicine and Rehab [phone] Your Primary Care Physician (PCP) Primary Care Physician Office Phone Office Fax Nalini Barakat [phone] Follow-up Information None Current Discharge Medication List  
  
ASK your doctor about these medications Dose & Instructions Dispensing Information Comments Morning Noon Evening Bedtime  
 amitriptyline 25 mg tablet Commonly known as:  ELAVIL Your last dose was: Your next dose is:    
   
   
 Dose:  25 mg Take 1 Tab by mouth nightly. Quantity:  90 Tab Refills:  3 CALTRATE 600+D PLUS MINERALS 600 mg calcium- 400 unit Tab Generic drug:  Calcium Carbonate-Vit D3-Min Your last dose was: Your next dose is: Take  by mouth. Refills:  0  
     
   
   
   
  
 citalopram 20 mg tablet Commonly known as:  Janice Never Your last dose was: Your next dose is:    
   
   
 Dose:  20 mg Take 1 Tab by mouth daily. Quantity:  90 Tab Refills:  3  
     
   
   
   
  
 estradiol 0.0375 mg/24 hr  
Commonly known as:  CLIMARA Your last dose was: Your next dose is:    
   
   
 Dose:  1 Patch 1 Patch by TransDERmal route Every Saturday. Quantity:  12 Patch Refills:  3 FISH OIL 1,000 mg Cap Generic drug:  omega-3 fatty acids-vitamin e Your last dose was: Your next dose is:    
   
   
 Dose:  1 Cap Take 1 capsule by mouth. Refills:  0  
     
   
   
   
  
 multivitamin tablet Commonly known as:  ONE A DAY Your last dose was: Your next dose is:    
   
   
 Dose:  1 Tab Take 1 tablet by mouth daily. Refills:  0 Discharge Instructions ACTIVITY · As tolerated and as directed by your doctor. · Bathe or shower as directed by your doctor. DIET · Clear liquids until no nausea or vomiting; then light diet for the first day. · Advance to regular diet on second day, unless your doctor orders otherwise. · If nausea and vomiting continues, call your doctor. PAIN 
· Take pain medication as directed by your doctor. · Call your doctor if pain is NOT relieved by medication. · DO NOT take aspirin of blood thinners unless directed by your doctor. DRESSING CARE  
 
 
 
 
DISCHARGE SUMMARY from Nurse PATIENT INSTRUCTIONS: 
 
 After general anesthesia or intravenous sedation, for 24 hours or while taking prescription Narcotics: · Limit your activities · Do not drive and operate hazardous machinery · Do not make important personal or business decisions · Do  not drink alcoholic beverages · If you have not urinated within 8 hours after discharge, please contact your surgeon on call. *  Please give a list of your current medications to your Primary Care Provider. *  Please update this list whenever your medications are discontinued, doses are 
    changed, or new medications (including over-the-counter products) are added. *  Please carry medication information at all times in case of emergency situations. These are general instructions for a healthy lifestyle: No smoking/ No tobacco products/ Avoid exposure to second hand smoke Surgeon General's Warning:  Quitting smoking now greatly reduces serious risk to your health. Obesity, smoking, and sedentary lifestyle greatly increases your risk for illness A healthy diet, regular physical exercise & weight monitoring are important for maintaining a healthy lifestyle You may be retaining fluid if you have a history of heart failure or if you experience any of the following symptoms:  Weight gain of 3 pounds or more overnight or 5 pounds in a week, increased swelling in our hands or feet or shortness of breath while lying flat in bed. Please call your doctor as soon as you notice any of these symptoms; do not wait until your next office visit. Recognize signs and symptoms of STROKE: 
 
F-face looks uneven A-arms unable to move or move unevenly S-speech slurred or non-existent T-time-call 911 as soon as signs and symptoms begin-DO NOT go Back to bed or wait to see if you get better-TIME IS BRAIN. Discharge Orders None Introducing [facility]!    
 [facility] introduces [employer] patient portal. Now you can access parts of your medical record, email your doctor's office, and request medication refills online. 1. In your internet browser, go to [email or web address]. [employer] 2. Click on the First Time User? Click Here link in the Sign In box. You will see the New Member Sign Up page. 3. Enter your myThings Access Code exactly as it appears below. You will not need to use this code after youve completed the sign-up process. If you do not sign up before the expiration date, you must request a new code. · myThings Access Code: [email or web address] Expires: 11/13/2017  1:43 PM 
 
4. Enter the last four digits of your Social Security Number (xxxx) and Date of Birth (mm/dd/yyyy) as indicated and click Submit. You will be taken to the next sign-up page. 5. Create a myThings ID. This will be your myThings login ID and cannot be changed, so think of one that is secure and easy to remember. 6. Create a myThings password. You can change your password at any time. 7. Enter your Password Reset Question and Answer. This can be used at a later time if you forget your password. 8. Enter your e-mail address. You will receive e-mail notification when new information is available in [address] 9. Click Sign Up. You can now view and download portions of your medical record. 10. Click the Download Summary menu link to download a portable copy of your medical information. If you have questions, please visit the Frequently Asked Questions section of the myThings website. Remember, myThings is NOT to be used for urgent needs. For medical emergencies, dial 911. Now available from your iPhone and Android! General Information Please provide this summary of care documentation to your next provider. Patient Signature:  ____________________________________________________________ Date:  ____________________________________________________________  
  
Althia Kras  Provider Signature: ____________________________________________________________ Date:  ____________________________________________________________

## 2017-10-19 NOTE — PROCEDURES
Procedure Note    Patient: Jose Carmen MRN: [id number]  SSN: [id number]    YOB: 1960  Age: 62 y.o. Sex: female      Date of Procedure: 10/19/2017     PREOPERATIVE DIAGNOSES:  1) Coccydynia    POSTOPERATIVE DIAGNOSES: Same. CONTRAST: Omnipaque 300    PROCEDURES PERFORMED: Ganglion Impar Block    PROCEDURE: The patient was brought into the fluoroscopy room and placed in a prone position. The patient was sterilely prepped and draped in the usual routine fashion. The patient's blood pressure and pulse were monitored by the physician and the nurse. The coccygeal joint was also visualized in an AP plane. The area superficial to this was marked and anesthetized with approximately 1 cc of 1% preservative free lidocaine. The needle was advanced into the joint under fluoroscopic guidance in the lateral view. Using a slow, corkscrew motion, the needle was advanced just anterior to coccygeal joint corresponding to the location of the ganglion impar. After checking for negative withdrawal of blood or CSF through the needle, proper placement was verified using approximately 0.5 mL of contrast injected under live fluoroscopy producing a dye pattern anterior to the sacrum corresponding to the ganglion impar location. No vascular flow was seen. A spot film was taken. This was done in both the AP and lateral views individually. A solution containing 1 cc of DepoMedrol (40 mg/mL) and 4 cc of 1% preservative-free lidocaine was slowly administered through high caliber short-length extension tubing. The patient tolerated the procedure well. There were no complications. Vital signs remained stable prior to, during and after the procedure. The patient was taken to the recovery room in good condition.

## 2017-10-19 NOTE — DISCHARGE INSTRUCTIONS
ACTIVITY  · As tolerated and as directed by your doctor. · Bathe or shower as directed by your doctor. DIET  · Clear liquids until no nausea or vomiting; then light diet for the first day. · Advance to regular diet on second day, unless your doctor orders otherwise. · If nausea and vomiting continues, call your doctor. PAIN  · Take pain medication as directed by your doctor. · Call your doctor if pain is NOT relieved by medication. · DO NOT take aspirin of blood thinners unless directed by your doctor. DRESSING CARE       CALL YOUR DOCTOR IF   · Excessive bleeding that does not stop after holding pressure over the area  · Temperature of 101 degrees F or above  · Excessive redness, swelling or bruising, and/ or green or yellow, smelly discharge from incision    AFTER ANESTHESIA   · For the first 24 hours: DO NOT Drive, Drink alcoholic beverages, or Make important decisions. · Be aware of dizziness following anesthesia and while taking pain medication. APPOINTMENT DATE/ TIME  Call for appointment in a couple of weeks    YOUR DOCTOR'S PHONE NUMBER       DISCHARGE SUMMARY from Nurse    PATIENT INSTRUCTIONS:    After general anesthesia or intravenous sedation, for 24 hours or while taking prescription Narcotics:  · Limit your activities  · Do not drive and operate hazardous machinery  · Do not make important personal or business decisions  · Do  not drink alcoholic beverages  · If you have not urinated within 8 hours after discharge, please contact your surgeon on call. *  Please give a list of your current medications to your Primary Care Provider. *  Please update this list whenever your medications are discontinued, doses are      changed, or new medications (including over-the-counter products) are added. *  Please carry medication information at all times in case of emergency situations.       These are general instructions for a healthy lifestyle:    No smoking/ No tobacco products/ Avoid exposure to second hand smoke    Surgeon General's Warning:  Quitting smoking now greatly reduces serious risk to your health. Obesity, smoking, and sedentary lifestyle greatly increases your risk for illness    A healthy diet, regular physical exercise & weight monitoring are important for maintaining a healthy lifestyle    You may be retaining fluid if you have a history of heart failure or if you experience any of the following symptoms:  Weight gain of 3 pounds or more overnight or 5 pounds in a week, increased swelling in our hands or feet or shortness of breath while lying flat in bed. Please call your doctor as soon as you notice any of these symptoms; do not wait until your next office visit. Recognize signs and symptoms of STROKE:    F-face looks uneven    A-arms unable to move or move unevenly    S-speech slurred or non-existent    T-time-call 911 as soon as signs and symptoms begin-DO NOT go       Back to bed or wait to see if you get better-TIME IS BRAIN.

## 2018-10-05 ENCOUNTER — HOSPITAL ENCOUNTER (OUTPATIENT)
Dept: MAMMOGRAPHY | Age: 58
Discharge: HOME | End: 2018-10-05
Attending: FAMILY MEDICINE
Payer: SELF-PAY

## 2018-10-05 DIAGNOSIS — Z78.0 POST-MENOPAUSAL: ICD-10-CM

## 2018-10-05 DIAGNOSIS — Z12.39 SCREENING BREAST EXAMINATION: ICD-10-CM

## 2018-10-05 PROCEDURE — 77067 SCR MAMMO BI INCL CAD: CPT

## 2018-10-05 PROCEDURE — 77080 DXA BONE DENSITY AXIAL: CPT

## 2018-10-24 NOTE — PROGRESS NOTES
Osteopenia-Bone density shows early bone loss. You should continue vit D supplement and make sure you are getting calcium through diet or supplement. Exercise is jeffries to building bone.  Repeat bone density in 2 years

## 2020-02-21 ENCOUNTER — HOSPITAL ENCOUNTER (OUTPATIENT)
Dept: MAMMOGRAPHY | Age: 60
Discharge: HOME | End: 2020-02-21
Attending: INTERNAL MEDICINE
Payer: SELF-PAY

## 2020-02-21 DIAGNOSIS — Z12.31 VISIT FOR SCREENING MAMMOGRAM: ICD-10-CM

## 2020-02-21 PROCEDURE — 77067 SCR MAMMO BI INCL CAD: CPT

## 2020-05-28 ENCOUNTER — APPOINTMENT (RX ONLY)
Dept: URBAN - METROPOLITAN AREA CLINIC 24 | Facility: CLINIC | Age: 60
Setting detail: DERMATOLOGY
End: 2020-05-28

## 2020-05-28 DIAGNOSIS — L81.4 OTHER MELANIN HYPERPIGMENTATION: ICD-10-CM

## 2020-05-28 DIAGNOSIS — Z80.8 FAMILY HISTORY OF MALIGNANT NEOPLASM OF OTHER ORGANS OR SYSTEMS: ICD-10-CM

## 2020-05-28 DIAGNOSIS — L57.0 ACTINIC KERATOSIS: ICD-10-CM

## 2020-05-28 DIAGNOSIS — Z71.89 OTHER SPECIFIED COUNSELING: ICD-10-CM

## 2020-05-28 DIAGNOSIS — L72.0 EPIDERMAL CYST: ICD-10-CM

## 2020-05-28 DIAGNOSIS — D18.0 HEMANGIOMA: ICD-10-CM

## 2020-05-28 DIAGNOSIS — L73.8 OTHER SPECIFIED FOLLICULAR DISORDERS: ICD-10-CM

## 2020-05-28 DIAGNOSIS — L82.1 OTHER SEBORRHEIC KERATOSIS: ICD-10-CM

## 2020-05-28 PROBLEM — D18.01 HEMANGIOMA OF SKIN AND SUBCUTANEOUS TISSUE: Status: ACTIVE | Noted: 2020-05-28

## 2020-05-28 PROCEDURE — ? OTHER

## 2020-05-28 PROCEDURE — 17000 DESTRUCT PREMALG LESION: CPT

## 2020-05-28 PROCEDURE — ? LIQUID NITROGEN (COSMETIC)

## 2020-05-28 PROCEDURE — 99203 OFFICE O/P NEW LOW 30 MIN: CPT | Mod: 25

## 2020-05-28 PROCEDURE — 17003 DESTRUCT PREMALG LES 2-14: CPT

## 2020-05-28 PROCEDURE — ? LIQUID NITROGEN

## 2020-05-28 PROCEDURE — ? COUNSELING

## 2020-05-28 ASSESSMENT — LOCATION ZONE DERM
LOCATION ZONE: TRUNK
LOCATION ZONE: NECK
LOCATION ZONE: LEG
LOCATION ZONE: ARM
LOCATION ZONE: FACE
LOCATION ZONE: SCALP

## 2020-05-28 ASSESSMENT — LOCATION SIMPLE DESCRIPTION DERM
LOCATION SIMPLE: RIGHT SHOULDER
LOCATION SIMPLE: LEFT FOREHEAD
LOCATION SIMPLE: RIGHT FOREHEAD
LOCATION SIMPLE: RIGHT SCALP
LOCATION SIMPLE: RIGHT CHEEK
LOCATION SIMPLE: NECK
LOCATION SIMPLE: ABDOMEN
LOCATION SIMPLE: RIGHT PRETIBIAL REGION
LOCATION SIMPLE: UPPER BACK
LOCATION SIMPLE: RIGHT UPPER BACK
LOCATION SIMPLE: LEFT FOREARM
LOCATION SIMPLE: LEFT SHOULDER
LOCATION SIMPLE: SCALP
LOCATION SIMPLE: RIGHT FOREARM
LOCATION SIMPLE: LEFT CHEEK
LOCATION SIMPLE: CHEST

## 2020-05-28 ASSESSMENT — LOCATION DETAILED DESCRIPTION DERM
LOCATION DETAILED: RIGHT DISTAL DORSAL FOREARM
LOCATION DETAILED: LEFT CENTRAL PARIETAL SCALP
LOCATION DETAILED: RIGHT FOREHEAD
LOCATION DETAILED: RIGHT SUPERIOR UPPER BACK
LOCATION DETAILED: RIGHT CENTRAL FRONTAL SCALP
LOCATION DETAILED: RIGHT MID-UPPER BACK
LOCATION DETAILED: LEFT INFERIOR CENTRAL MALAR CHEEK
LOCATION DETAILED: RIGHT RIB CAGE
LOCATION DETAILED: LEFT POSTERIOR SHOULDER
LOCATION DETAILED: RIGHT CENTRAL MALAR CHEEK
LOCATION DETAILED: LEFT PROXIMAL DORSAL FOREARM
LOCATION DETAILED: RIGHT POSTERIOR SHOULDER
LOCATION DETAILED: LEFT MEDIAL FOREHEAD
LOCATION DETAILED: SUPERIOR THORACIC SPINE
LOCATION DETAILED: RIGHT DISTAL PRETIBIAL REGION
LOCATION DETAILED: LEFT MEDIAL SUPERIOR CHEST
LOCATION DETAILED: LEFT DISTAL DORSAL FOREARM
LOCATION DETAILED: RIGHT SUPERIOR LATERAL NECK
LOCATION DETAILED: RIGHT SUPERIOR FRONTAL SCALP
LOCATION DETAILED: SUBXIPHOID

## 2020-05-28 NOTE — PROCEDURE: LIQUID NITROGEN
Duration Of Freeze Thaw-Cycle (Seconds): 0
Number Of Freeze-Thaw Cycles: 1 freeze-thaw cycle
Render Post-Care Instructions In Note?: yes
Consent: The patient's consent was obtained including but not limited to risks of crusting, scabbing, blistering, scarring, darker or lighter pigmentary change, recurrence, incomplete removal and infection.
Detail Level: Detailed
Post-Care Instructions: I reviewed with the patient in detail post-care instructions. Patient is to wear sunprotection, and avoid picking at any of the treated lesions. Pt may apply Vaseline to crusted or scabbing areas.
Render Note In Bullet Format When Appropriate: No

## 2021-04-22 ENCOUNTER — TRANSCRIBE ORDER (OUTPATIENT)
Dept: SCHEDULING | Age: 61
End: 2021-04-22

## 2021-04-22 DIAGNOSIS — Z12.31 VISIT FOR SCREENING MAMMOGRAM: Primary | ICD-10-CM

## 2021-06-14 ENCOUNTER — HOSPITAL ENCOUNTER (OUTPATIENT)
Dept: MAMMOGRAPHY | Age: 61
Discharge: HOME | End: 2021-06-14
Attending: FAMILY MEDICINE

## 2021-06-14 DIAGNOSIS — Z12.31 VISIT FOR SCREENING MAMMOGRAM: ICD-10-CM

## 2021-06-14 PROCEDURE — 77067 SCR MAMMO BI INCL CAD: CPT

## 2021-10-18 ENCOUNTER — APPOINTMENT (RX ONLY)
Dept: URBAN - METROPOLITAN AREA CLINIC 23 | Facility: CLINIC | Age: 61
Setting detail: DERMATOLOGY
End: 2021-10-18

## 2021-10-18 DIAGNOSIS — L81.4 OTHER MELANIN HYPERPIGMENTATION: ICD-10-CM

## 2021-10-18 DIAGNOSIS — I83.9 ASYMPTOMATIC VARICOSE VEINS OF LOWER EXTREMITIES: ICD-10-CM

## 2021-10-18 DIAGNOSIS — Z71.89 OTHER SPECIFIED COUNSELING: ICD-10-CM

## 2021-10-18 DIAGNOSIS — Z80.8 FAMILY HISTORY OF MALIGNANT NEOPLASM OF OTHER ORGANS OR SYSTEMS: ICD-10-CM

## 2021-10-18 DIAGNOSIS — D18.0 HEMANGIOMA: ICD-10-CM

## 2021-10-18 DIAGNOSIS — L91.8 OTHER HYPERTROPHIC DISORDERS OF THE SKIN: ICD-10-CM

## 2021-10-18 DIAGNOSIS — L73.8 OTHER SPECIFIED FOLLICULAR DISORDERS: ICD-10-CM

## 2021-10-18 DIAGNOSIS — D22 MELANOCYTIC NEVI: ICD-10-CM

## 2021-10-18 DIAGNOSIS — L57.8 OTHER SKIN CHANGES DUE TO CHRONIC EXPOSURE TO NONIONIZING RADIATION: ICD-10-CM

## 2021-10-18 DIAGNOSIS — L82.1 OTHER SEBORRHEIC KERATOSIS: ICD-10-CM

## 2021-10-18 DIAGNOSIS — L56.8 OTHER SPECIFIED ACUTE SKIN CHANGES DUE TO ULTRAVIOLET RADIATION: ICD-10-CM

## 2021-10-18 PROBLEM — D22.39 MELANOCYTIC NEVI OF OTHER PARTS OF FACE: Status: ACTIVE | Noted: 2021-10-18

## 2021-10-18 PROBLEM — D18.01 HEMANGIOMA OF SKIN AND SUBCUTANEOUS TISSUE: Status: ACTIVE | Noted: 2021-10-18

## 2021-10-18 PROBLEM — I83.91 ASYMPTOMATIC VARICOSE VEINS OF RIGHT LOWER EXTREMITY: Status: ACTIVE | Noted: 2021-10-18

## 2021-10-18 PROCEDURE — ? OTHER

## 2021-10-18 PROCEDURE — ? COUNSELING

## 2021-10-18 PROCEDURE — 99213 OFFICE O/P EST LOW 20 MIN: CPT

## 2021-10-18 ASSESSMENT — LOCATION SIMPLE DESCRIPTION DERM
LOCATION SIMPLE: SCALP
LOCATION SIMPLE: RIGHT CHEEK
LOCATION SIMPLE: RIGHT PRETIBIAL REGION
LOCATION SIMPLE: LEFT CHEEK
LOCATION SIMPLE: RIGHT FOREARM
LOCATION SIMPLE: RIGHT THIGH
LOCATION SIMPLE: LEFT SHOULDER
LOCATION SIMPLE: RIGHT UPPER BACK
LOCATION SIMPLE: RIGHT SCALP
LOCATION SIMPLE: RIGHT MIDDLE FINGER
LOCATION SIMPLE: ABDOMEN
LOCATION SIMPLE: RIGHT SHOULDER
LOCATION SIMPLE: LEFT FOREARM
LOCATION SIMPLE: CHEST
LOCATION SIMPLE: NECK
LOCATION SIMPLE: RIGHT ANTERIOR NECK

## 2021-10-18 ASSESSMENT — LOCATION ZONE DERM
LOCATION ZONE: NECK
LOCATION ZONE: FINGER
LOCATION ZONE: TRUNK
LOCATION ZONE: FACE
LOCATION ZONE: LEG
LOCATION ZONE: SCALP
LOCATION ZONE: ARM

## 2021-10-18 ASSESSMENT — LOCATION DETAILED DESCRIPTION DERM
LOCATION DETAILED: RIGHT DISTAL PRETIBIAL REGION
LOCATION DETAILED: RIGHT DISTAL DORSAL FOREARM
LOCATION DETAILED: RIGHT CENTRAL FRONTAL SCALP
LOCATION DETAILED: RIGHT PROXIMAL DORSAL MIDDLE FINGER
LOCATION DETAILED: SUBXIPHOID
LOCATION DETAILED: RIGHT INFERIOR LATERAL NECK
LOCATION DETAILED: RIGHT RIB CAGE
LOCATION DETAILED: RIGHT POSTERIOR SHOULDER
LOCATION DETAILED: RIGHT SUPERIOR MEDIAL UPPER BACK
LOCATION DETAILED: LEFT DISTAL DORSAL FOREARM
LOCATION DETAILED: RIGHT SUPERIOR NASAL CHEEK
LOCATION DETAILED: RIGHT SUPERIOR LATERAL NECK
LOCATION DETAILED: RIGHT SUPERIOR UPPER BACK
LOCATION DETAILED: LEFT POSTERIOR SHOULDER
LOCATION DETAILED: LEFT CENTRAL PARIETAL SCALP
LOCATION DETAILED: RIGHT INFERIOR CENTRAL MALAR CHEEK
LOCATION DETAILED: LEFT MEDIAL MALAR CHEEK
LOCATION DETAILED: MIDDLE STERNUM
LOCATION DETAILED: RIGHT ANTERIOR PROXIMAL THIGH

## 2021-10-18 NOTE — HPI: FULL BODY SKIN EXAMINATION
What Type Of Note Output Would You Prefer (Optional)?: Standard Output
What Is The Reason For Today's Visit?: Full Body Skin Examination
What Is The Reason For Today's Visit? (Being Monitored For X): concerning skin lesions on an annual basis
How Severe Are Your Spot(S)?: mild
Additional History: Pt gives verbal consent for biopsy.Shruti

## 2021-10-18 NOTE — PROCEDURE: OTHER
Detail Level: Detailed
Other (Free Text): [facility] for veins
Detail Level: Zone
Note Text (......Xxx Chief Complaint.): This diagnosis correlates with the
Other (Free Text): Parents
Render Risk Assessment In Note?: no

## 2022-07-29 ENCOUNTER — HOSPITAL ENCOUNTER (OUTPATIENT)
Dept: MAMMOGRAPHY | Age: 62
Discharge: HOME | End: 2022-08-01
Payer: COMMERCIAL

## 2022-07-29 DIAGNOSIS — Z12.31 VISIT FOR SCREENING MAMMOGRAM: ICD-10-CM

## 2022-07-29 PROCEDURE — 77067 SCR MAMMO BI INCL CAD: CPT

## 2022-08-11 ENCOUNTER — HOSPITAL ENCOUNTER (OUTPATIENT)
Dept: MAMMOGRAPHY | Age: 62
Discharge: HOME | End: 2022-08-14
Payer: COMMERCIAL

## 2022-08-11 DIAGNOSIS — R92.8 ABNORMAL SCREENING MAMMOGRAM: ICD-10-CM

## 2022-08-11 PROCEDURE — 76642 ULTRASOUND BREAST LIMITED: CPT

## 2022-08-16 ENCOUNTER — HOSPITAL ENCOUNTER (OUTPATIENT)
Dept: MAMMOGRAPHY | Age: 62
Discharge: HOME | End: 2022-08-19
Payer: COMMERCIAL

## 2022-08-16 ENCOUNTER — APPOINTMENT (OUTPATIENT)
Dept: MAMMOGRAPHY | Age: 62
End: 2022-08-16
Payer: COMMERCIAL

## 2022-08-16 VITALS — DIASTOLIC BLOOD PRESSURE: 82 MMHG | HEART RATE: 84 BPM | SYSTOLIC BLOOD PRESSURE: 153 MMHG

## 2022-08-16 DIAGNOSIS — R92.8 ABNORMAL MAMMOGRAM OF RIGHT BREAST: ICD-10-CM

## 2022-08-16 DIAGNOSIS — R92.8 ABNORMAL MAMMOGRAM: ICD-10-CM

## 2022-08-16 PROCEDURE — 2709999900 US BREAST BIOPSY W LOC DEVICE 1ST LESION RIGHT

## 2022-08-16 PROCEDURE — 2500000003 HC RX 250 WO HCPCS: Performed by: FAMILY MEDICINE

## 2022-08-16 PROCEDURE — 77065 DX MAMMO INCL CAD UNI: CPT

## 2022-08-16 PROCEDURE — 88305 TISSUE EXAM BY PATHOLOGIST: CPT

## 2022-08-16 RX ORDER — LIDOCAINE HYDROCHLORIDE 10 MG/ML
7 INJECTION, SOLUTION INFILTRATION; PERINEURAL ONCE
Status: COMPLETED | OUTPATIENT
Start: 2022-08-16 | End: 2022-08-16

## 2022-08-16 RX ADMIN — LIDOCAINE HYDROCHLORIDE 7 ML: 10 INJECTION, SOLUTION INFILTRATION; PERINEURAL at 09:01

## 2022-08-16 ASSESSMENT — PAIN DESCRIPTION - INTENSITY
RATING_3: 0
RATING_2: 0

## 2022-08-16 ASSESSMENT — PAIN SCALES - GENERAL: PAINLEVEL_OUTOF10: 0

## 2022-08-18 ENCOUNTER — CLINICAL DOCUMENTATION (OUTPATIENT)
Dept: CASE MANAGEMENT | Age: 62
End: 2022-08-18

## 2022-08-18 ENCOUNTER — CLINICAL DOCUMENTATION (OUTPATIENT)
Dept: MAMMOGRAPHY | Age: 62
End: 2022-08-18

## 2022-08-18 NOTE — PROGRESS NOTES
8/18/2022 The patient called and she is trying to decide whether she wants to pursue treatment with [facility] or [facility]. We discussed our program and she does want to see a female oncologist but surgeon she does not have a preference. She would like to talk with her PCP and will get back to me regarding her final decision.

## 2022-08-18 NOTE — PROGRESS NOTES
The patient and her  returned to the breast center today to discuss the results from her recent right breast biopsy, pathology came back as IDC. Dr. Rausch So and I discussed the results as well as the next steps, she doesn't need a breast MRI and has been referred to the oncology navigators.

## 2022-08-22 ENCOUNTER — TELEPHONE (OUTPATIENT)
Dept: CASE MANAGEMENT | Age: 62
End: 2022-08-22

## 2022-08-22 NOTE — TELEPHONE ENCOUNTER
8/22/2022  Breast Navigation  intake complete for New Patient Breast Cancer. Reviewed role of navigation, gave contact information for navigators, discussed pathology report and  pending receptor status, reviewed upcoming appointments. Patient is working as a . Independent in self care   Barriers:  None noted. Lives with  Vik Olson who is her primary support person. Verbal permission given to speak with Vik Olson. Family history of breast cancer:  none   Type of cancer: Right breast cancer ER 97%, FL 93% and Her 2 negative   MRI   not recommended  Social determinants of health and Depression screen complete. Referring Provider:  Dr Carol Castellanos MD and Navigator to treatment team   Appointment with Oncology: Dr Michelle Gomez 8/26   Appointment with Surgery: Dr Mally Pollock 9/8   Breast Multidisciplinary Conference presentation date:  pending for 9/1  My Chart message to patient with navigation contact information and upcoming appointments. Routed note to referring provider regarding intake and upcoming appointments.

## 2022-08-23 NOTE — PROGRESS NOTES
[address] [place] [address] Hematology and Oncology: New Patient - Consultation    Chief Complaint   Patient presents with    Follow-up     Reason for Referral: Breast cancer  Referring Provider:  Philly Noble MD  Family History of Cancer/Hematologic Disorders: None noted   Presenting Symptoms: abnormal routine screening mammogram    History of Present Illness:  Ms. Eleanor Hoffman is a 58 y.o. female who presents today in referral from Dr. Ravinder Alva for consultation regarding breast cancer. The past medical history is significant for anxiety, endocrine disorder, headache and menopause. Sxhx: hysterectomy and breast biopsy. In July 2022, Ms. Eleanor Hoffman presented for her routine screening mammogram. The imaging reported a right breast mass. On 8/11/22 she had a right breast US that reported a 5 mm solid mass in the breast. On 8/16/22 she underwent a core needle biopsy of the mass. The pathology reported the right breast mass as infiltrating ductal carcinoma, low grade (well differentiated). Definite in situ component and lymphovascular invasion were not identified. Her IHC staining reported ER (97%) and NH (93%) as positive and HER-2 (+1) as negative. A referral was placed to medical oncology for next steps in the plan of care for her newly diagnosed right breast cancer. She has an appointment for surgical consult with Dr Juana Ramirez on 9/8/22. never used tobacco products. She denies use of alcohol or drug substances. Today, she is here for consultation. During today's visit we discussed the pathophysiology of breast cancer, staging, and the importance of receptor status in terms of treatment options. We then reviewed her medical history as well as oncologic history, recent imaging and pathology in detail. We discussed next steps in management. Chronological Events:   6/14/21 Screening Mammogram  Impression       NO SPECIFIC MAMMOGRAPHIC EVIDENCE FOR MALIGNANCY. FOLLOW UP BILATERAL SCREENING   MAMMOGRAPHY IS RECOMMENDED IN ONE YEAR. 22 Screening Mammogram  Impression   Right mass for which further evaluation is recommended with targeted   ultrasound. 22 Right Breast Ultrasound  Impression   5 mm solid mass in the 2:00 right breast at 6 cm from the nipple. An   ultrasound-guided biopsy is recommended. 2022 Surgical Pathology  DIAGNOSIS        A:  \" RIGHT BREAST, 2:00 POSITION, 6 CM FROM NIPPLE, CORE BIOPSY\":         INFILTRATING DUCTAL CARCINOMA, LOW GRADE (WELL DIFFERENTIATED).    DEFINITE IN SITU COMPONENT AND LYMPHOVASCULAR INVASION ARE NOT IDENTIFIED   Procedures/Addenda                     STF- ER/SD/[id number] BY IHC                          Interpretation                       [employer] IHC Quantitative Breast Panel     TEST NAME:                                          RESULTS:               INTERNAL CONTROLS:     ESTROGEN RECEPTOR:                     Positive (97%)               Present, Positive   PROGESTERONE RECEPTOR:           Positive (93%)               Present, Positive   HER-2/RAMONA:                                           Negative (1+)               Percentage of Cells with Uniform                                                                                                               Intense Complete Membrane                                                                                                               Stainin%     22 heme/onc consultation       Family History   Problem Relation Age of Onset    Heart Disease Father     Heart Disease Brother     Breast Cancer Neg Hx       Social History     Socioeconomic History    Marital status:      Spouse name: None    Number of children: None    Years of education: None    Highest education level: None   Tobacco Use    Smoking status: Never    Smokeless tobacco: Never   Substance and Sexual Activity    Alcohol use: No    Drug use: No        Review of Systems   Constitutional:  Negative for appetite change, chills, diaphoresis, fatigue, fever and unexpected weight change. HENT:   Negative for hearing loss, mouth sores, nosebleeds, sore throat, trouble swallowing and voice change. Eyes:  Negative for icterus. Respiratory:  Negative for chest tightness, hemoptysis, shortness of breath and wheezing. Cardiovascular:  Negative for chest pain, leg swelling and palpitations. Gastrointestinal:  Negative for abdominal distention, abdominal pain, blood in stool, constipation, diarrhea, nausea and vomiting. Endocrine: Negative for hot flashes. Genitourinary:  Negative for difficulty urinating, frequency, vaginal bleeding and vaginal discharge. Musculoskeletal:  Negative for arthralgias, flank pain, gait problem and myalgias. Skin:  Negative for itching, rash and wound. Neurological:  Negative for dizziness, extremity weakness, gait problem, headaches and numbness. Psychiatric/Behavioral:  Positive for depression. Negative for confusion. The patient is nervous/anxious. No Known Allergies  Past Medical History:   Diagnosis Date    Anxiety     Endocrine disorder     Headache     Menopause      Past Surgical History:   Procedure Laterality Date    HYSTERECTOMY (CERVIX STATUS UNKNOWN)      FOR UTERINE PROLAPSE, DID NOT TAKE OVARIES    US BREAST NEEDLE BIOPSY RIGHT Right 8/16/2022    US BREAST NEEDLE BIOPSY RIGHT 8/16/2022 E RADIOLOGY MAMMO     Current Outpatient Medications   Medication Sig Dispense Refill    Multiple Vitamin (MULTIVITAMIN ADULT PO) Take 1 tablet by mouth daily      rosuvastatin (CRESTOR) 10 MG tablet       amitriptyline (ELAVIL) 25 MG tablet Take 25 mg by mouth      citalopram (CELEXA) 20 MG tablet Take 20 mg by mouth daily       No current facility-administered medications for this visit. No flowsheet data found.     OBJECTIVE:  /70 (Site: Right Upper Arm, Position: Standing)   Pulse 96   Temp 98.5 °F (36.9 °C)   Resp 12   Ht 5' 4\" (1.626 m)   Wt 118 lb 1.6 oz (53.6 kg)   SpO2 97%   BMI 20.27 kg/m²       ECOG PERFORMANCE STATUS - 0-Fully active, able to carry on all pre-disease performance without restriction. Pain - 0 - No pain/10. None/Minimal pain - not affecting QOL     Fatigue - No flowsheet data found. Distress - No flowsheet data found. Physical Exam  Vitals reviewed. Exam conducted with a chaperone present. Constitutional:       General: She is not in acute distress. Appearance: Normal appearance. She is normal weight. She is not ill-appearing or toxic-appearing. HENT:      Head: Normocephalic and atraumatic. Nose: Nose normal.      Mouth/Throat:      Mouth: Mucous membranes are moist.   Eyes:      General: No scleral icterus. Extraocular Movements: Extraocular movements intact. Conjunctiva/sclera: Conjunctivae normal.      Pupils: Pupils are equal, round, and reactive to light. Cardiovascular:      Rate and Rhythm: Normal rate and regular rhythm. Heart sounds: No murmur heard. Pulmonary:      Effort: Pulmonary effort is normal. No respiratory distress. Breath sounds: Normal breath sounds. No wheezing or rales. Chest:   Breasts:     Right: No axillary adenopathy or supraclavicular adenopathy. Left: No axillary adenopathy or supraclavicular adenopathy. Comments: Post bx changes   No axillary LAD   Abdominal:      General: There is no distension. Palpations: Abdomen is soft. Tenderness: There is no abdominal tenderness. Musculoskeletal:         General: Normal range of motion. Cervical back: Normal range of motion. Right lower leg: No edema. Left lower leg: No edema. Lymphadenopathy:      Cervical: No cervical adenopathy. Upper Body:      Right upper body: No supraclavicular or axillary adenopathy. Left upper body: No supraclavicular or axillary adenopathy. Skin:     General: Skin is warm and dry. Coloration: Skin is not jaundiced or pale. Findings: No rash.    Neurological: General: No focal deficit present. Mental Status: She is alert and oriented to person, place, and time. Gait: Gait normal.   Psychiatric:         Behavior: Behavior normal.         Thought Content: Thought content normal.        Labs:  No results found for this or any previous visit (from the past 168 hour(s)). Imaging: reviewed     PATHOLOGY:              ASSESSMENT:     Diagnosis Orders   1. Malignant neoplasm of breast in female, estrogen receptor positive, unspecified laterality, unspecified site of breast [facility])            Ms. Tyra Toussaint is here for evaluation of breast cancer. Right breast IDC, low grade, ER97/PR93 and Her2 +1, negative, s/p core bx, no DCIS or LVI     - Today, she is here for consultation. During today's visit we discussed the pathophysiology of breast cancer, staging, and the importance of receptor status in terms of treatment options. We then reviewed her medical history as well as oncologic history, recent imaging and pathology in detail. We discussed next steps in management.   She will p/w sx upfront  - role of OncotypeDx reviewed and it's prognostic/predictive value   - final staging will be determined at time of sx - she VU   - certainly a candidate for endo therapy - MOA and SE reviewed.    -advised to stop HRT, can try venlafaxine for hot flashes     RESUSCITATION DIRECTIVES/HOSPICE CARE: Full Support    RTC after sx or sooner as needed   [60min - chart review, consultation, review of results and discussion of options, next steps, coordination of care and charting ]      MDM  Number of Diagnoses or Management Options  Malignant neoplasm of breast in female, estrogen receptor positive, unspecified laterality, unspecified site of breast ([facility] [address]): new, needed workup     Amount and/or Complexity of Data Reviewed  Clinical lab tests: ordered and reviewed  Tests in the radiology section of CPT®: ordered and reviewed  Review and summarize past medical records: yes  Independent visualization of images, tracings, or specimens: yes    Risk of Complications, Morbidity, and/or Mortality  Presenting problems: moderate  Diagnostic procedures: moderate  Management options: high        Lab studies and imaging studies were personally reviewed. Pertinent old records were reviewed. All questions were asked and answered to the best of my ability. The patient verbalized understanding and agrees with the plan above. Documentation was sent to Dr. Ana Cristina Chisholm for continuation of care.   Thank you, Dr. Ana Cristina Chisholm, for the courtesy of this referral.        [facility] Cristine Given) Marco Mercado[address] Hematology and Oncology  [address] [place] [address]  Office : [phone]  Fax : [phone]

## 2022-08-25 NOTE — PROGRESS NOTES
New Patient Abstract    Reason for Referral: Breast cancer    Referring Provider:  Yuliana Grey MD    Primary Care Provider: Yuliana Grey MD    Family History of Cancer/Hematologic Disorders: None noted     Presenting Symptoms: abnormal routine screening mammogram    Narrative with recent with Results/Procedures/Biopsies and Dates completed:   Ms. Claudeen Slack is a 79-year-old  female who reports to have never used tobacco products. She denies use of alcohol or drug substances. Her medical history reports as anxiety, endocrine disorder, headache and menopause. Her surgical history reports as hysterectomy and breast biopsy. In July 2022, Ms. Claudeen Slack presented for her routine screening mammogram. The imaging reported a right breast mass. On 8/11/22 she had a right breast ultrasound that reported a 5 mm solid mass in the breast. On 8/16/22 she underwent a core needle biopsy of the mass. The pathology reported the right breast mass as infiltrating ductal carcinoma, low grade (well differentiated). Definite in situ component and lymphovascular invasion were not identified. Her IHC staining reported ER (97%) and DE (93%) as positive and HER-2 (+1) as negative. A referral was placed to medical oncology for next steps in the plan of care for her newly diagnosed right breast cancer. She has an appointment for surgical consult with Dr Sobeida Griffin on 9/8/22.    6/14/21 Screening Mammogram  Impression       NO SPECIFIC MAMMOGRAPHIC EVIDENCE FOR MALIGNANCY. FOLLOW UP BILATERAL SCREENING   MAMMOGRAPHY IS RECOMMENDED IN ONE YEAR.      7/29/22 Screening Mammogram  Impression   Right mass for which further evaluation is recommended with targeted   ultrasound. 8/11/22 Right Breast Ultrasound  Impression   5 mm solid mass in the 2:00 right breast at 6 cm from the nipple. An   ultrasound-guided biopsy is recommended.      8/16/2022 Surgical Pathology  DIAGNOSIS        A:  \" RIGHT BREAST, 2:00 POSITION, 6 CM FROM NIPPLE, CORE BIOPSY\": INFILTRATING DUCTAL CARCINOMA, LOW GRADE (WELL DIFFERENTIATED).    DEFINITE IN SITU COMPONENT AND LYMPHOVASCULAR INVASION ARE NOT IDENTIFIED   Procedures/Addenda                     STF- ER/RI/[id number] BY IHC                          Interpretation                       [employer] IHC Quantitative Breast Panel     TEST NAME:                                          RESULTS:               INTERNAL CONTROLS:     ESTROGEN RECEPTOR:                     Positive (97%)               Present, Positive   PROGESTERONE RECEPTOR:           Positive (93%)               Present, Positive   HER-2/RAMONA:                                           Negative (1+)               Percentage of Cells with Uniform                                                                                                               Intense Complete Membrane                                                                                                               Stainin%    Notes from Referring Provider: n/a    Other Pertinent Information: n/a    Presented at Tumor Board: Pending presentation on 22

## 2022-08-26 ENCOUNTER — OFFICE VISIT (OUTPATIENT)
Dept: ONCOLOGY | Age: 62
End: 2022-08-26
Payer: COMMERCIAL

## 2022-08-26 VITALS
BODY MASS INDEX: 20.16 KG/M2 | OXYGEN SATURATION: 97 % | DIASTOLIC BLOOD PRESSURE: 70 MMHG | TEMPERATURE: 98.5 F | RESPIRATION RATE: 12 BRPM | SYSTOLIC BLOOD PRESSURE: 110 MMHG | HEART RATE: 96 BPM | HEIGHT: 64 IN | WEIGHT: 118.1 LBS

## 2022-08-26 DIAGNOSIS — C50.919 MALIGNANT NEOPLASM OF BREAST IN FEMALE, ESTROGEN RECEPTOR POSITIVE, UNSPECIFIED LATERALITY, UNSPECIFIED SITE OF BREAST (HCC): ICD-10-CM

## 2022-08-26 DIAGNOSIS — Z17.0 MALIGNANT NEOPLASM OF BREAST IN FEMALE, ESTROGEN RECEPTOR POSITIVE, UNSPECIFIED LATERALITY, UNSPECIFIED SITE OF BREAST (HCC): ICD-10-CM

## 2022-08-26 PROCEDURE — 99205 OFFICE O/P NEW HI 60 MIN: CPT | Performed by: INTERNAL MEDICINE

## 2022-08-26 RX ORDER — ROSUVASTATIN CALCIUM 10 MG/1
10 TABLET, COATED ORAL DAILY
COMMUNITY
Start: 2022-06-20

## 2022-08-26 ASSESSMENT — PATIENT HEALTH QUESTIONNAIRE - PHQ9
SUM OF ALL RESPONSES TO PHQ QUESTIONS 1-9: 0
2. FEELING DOWN, DEPRESSED OR HOPELESS: 0

## 2022-08-26 NOTE — PATIENT INSTRUCTIONS
Patient Instructions from Today's Visit    Reason for Visit:  New patient visit for breast cancer: ER+, MA+; invasive ductal carcinoma (IDC), low-grade    Diagnosis Information:  https://www.[employer]/. net/about-us/asco-answers-patient-education-materials/rgkc-eltqkut-mtyn-sheets  Patient was educated and given handouts published by ASCO entitled ASCO Answers Fact Sheets about their diagnosis of breast cancer during todays office visit. Plan:  -Your breast tumor biopsy showed your cancer is 97% estrogen receptor positive, 93% progesterone positive and HER2 negative.   -To treat this, we will prescribe an anti-hormonal pill to deplete the hormones in your body   -There is a test called Oncotype dx which is a 21 gene assay to give us predictive and prognostic information about whether or not you may benefit from chemotherapy.   -Do not take black cohosh or estrogen supplements. Always let us know if you decide to start an herbal supplement. -Venlafaxine is an antidepressant that may be taken at a low-dose to help with hot flashes. -Appointment with surgeon scheduled on 9/8. Follow Up:  -2-3 weeks after surgery  -Please send us a InVitae message once your surgery date is set    Treatment Summary has been discussed and given to patient: N/A      -------------------------------------------------------------------------------------------------------------------  Please call our office at [phone] if you have any  of the following symptoms:   Fever of 100.5 or greater  Chills  Shortness of breath  Swelling or pain in one leg    After office hours an answering service is available and will contact a provider for emergencies or if you are experiencing any of the above symptoms. Patient does express an interest in My Chart. My Chart log in information explained on the after visit summary printout at the [facility] desk.     Franne Stephane, RN     ASCO ANSWERS is a collection of oncologist-approved patient education materials developed by the [employer] of Clinical Oncology (ASCO) for people with cancer and their caregivers. Breast Cancer    What is breast cancer? Breast cancer begins when healthy breast cells change and grow out of control, usually forming a mass called a tumor. Breast cancer is the most common type of cancer diagnosed in women in the [facility] (excluding skin cancer). What are the parts of the breast?   Most of the breast is fatty tissue. However, it also contains a network of lobes that are made up of tiny, tube-like structures called lobules that contain milk glands. Tiny ducts connect the glands, lobules, and lobes, and carry milk from the lobes to the nipple. Most breast cancers begin in the cells lining the milk ducts and are called ductal carcinomas. The second most common type starts in the lobules and is called lobular carcinoma. What does stage mean? The stage is a way of describing where the cancer is located, how much the cancer has grown, and if or where it has spread. There are 5 stages for breast cancer: stage 0 (zero), which is called noninvasive cancer or ductal carcinoma in situ (DCIS), and stages I through IV (1 through 4). Descriptions and illustrations of these stages are available at www.cancer. net/breast.     How is breast cancer treated? The biology and behavior of a breast cancer affect the treatment plan, and every persons cancer is different. Doctors consider many factors when recommending a treatment plan, including the cancers stage; the tumors human epidermal growth factor receptor 2 (HER2) status and the hormone receptor status, which includes estrogen receptors (ER) and progesterone receptors ([place]); the presence of known mutations (changes) in breast cancer genes; and the womans age, general health, and whether she has experienced menopause.  For earlier stages of cancer, surgery to remove the tumor and nearby lymph nodes usually is the first treatment. Additional treatment with chemotherapy, radiation therapy, hormonal therapy, or targeted therapy is usually given after surgery to lower the risk of the cancer returning. These treatments may also be given before surgery to shrink the size of the tumor. The treatment of cancer that has spread or come back after treatment depends on many factors. It can include the therapies listed above used in a different combination or at a different pace. When making treatment decisions, women may also consider a clinical trial; talk with your doctor about all treatment options. The side effects of breast cancer treatment can be reduced or managed with a variety of medications and the help of your health care team. This is called palliative care and is an important part of the overall treatment plan. How can I cope with breast cancer? Absorbing the news of a cancer diagnosis and communicating with your health care team are key parts of the coping process. Seeking support, organizing your health information, making sure all of your questions are answered, and participating in the decision-making process are other steps. Talk with your health care team about any concerns. Understanding your emotions and those of people close to you can be helpful in managing the diagnosis, treatment, and healing process. [address] [place],[address]. © 2004 AMERICAN SOCIETY OF CLINICAL ONCOLOGY. MADE AVAILABLE THROUGH   Questions to ask the health care team   Regular communication is important in making informed decisions about your health care. Consider asking the following questions of your health care team:   What type of breast cancer do I have? Can you explain my pathology report (laboratory test results) to me? What stage is the breast cancer? What does this mean? What is the ER/AK status of the tumor? The HER2 status? What does this mean?    Would you explain my treatment options? What clinical trials are available for me? Where are they located, and how do I find out more about them? What treatment plan do you recommend? Why? Should treatment before surgery be considered? What is the goal of each treatment? Is it to eliminate the cancer, help me feel better, or both? Who will be part of my treatment team, and what does each member do? How will this treatment affect my daily life? Will I be able to work, exercise, and perform my usual activities? Will this treatment affect my ability to become pregnant or have children? What can be done to preserve my fertility? What long-term side effects are associated with my cancer treatment? If Im worried about managing the costs of cancer care, who can help me? Where can I find emotional support for me and my family? Whom should I call with questions or problems? Is there anything else I should be asking? Find more questions to ask the health care team at [employer]. net/breast and www.cancer. net/metastaticbreast. For a digital list of questions, download Cancer. Nets free mobile anastasia at www.cancer. net/anastasia. The ideas and opinions expressed here do not necessarily reflect the opinions of the American Society of Clinical Oncology (ASCO) or The [employer]. The information in this fact sheet is not intended as medical or legal advice, or as a substitute for consultation with a physician or other licensed health care provider. Patients with health care-related questions should call or see their physician or other health care provider promptly and should not disregard professional medical advice, or delay seeking it, because of information encountered here. The mention of any product, service, or treatment in this fact sheet should not be construed as an ASCO endorsement.  ASCO is not responsible for any injury or damage to persons or property arising out of or related to any use of ASCOs patient education materials, or to any errors or omissions. To order more printed copies, please call [phone] or visit www.cancer. net/estore. TERMS TO KNOW     Benign: A growth that is not cancerous     Biopsy: Removal of a small tissue sample that is examined under a microscope to check for cancer cells     Chemotherapy: The use of drugs to destroy cancer cells     DCIS: Ductal carcinoma in situ; cancer that has not spread past the ducts and is not invasive     Lymph node: A tiny, bean-shaped organ that fights infection     Lumpectomy: The surgical removal of the tumor and an area of healthy tissue around the tumor     Malignant: A cancerous growth or mass     Mastectomy: Surgical removal of the entire breast     Metastasis: The spread of cancer to another part of the body, usually to another organ     Oncologist: A doctor who specializes in treating cancer     Radiation therapy: The use of high-energy x-rays to destroy cancer cells     Tumor: An abnormal growth of body tissue     [address] [facility] [address] [place] [address], [address] [place] [address], [address] [place] [address]  Toll Free: [phone]  Phone: [phone] www. [employer]  www.conquer. org © 2017 American Society of Clinical Oncology.  For permissions information, contact [email or web address].   AABC17

## 2022-09-01 ENCOUNTER — CLINICAL DOCUMENTATION (OUTPATIENT)
Dept: CASE MANAGEMENT | Age: 62
End: 2022-09-01

## 2022-09-01 PROBLEM — C50.919 MALIGNANT NEOPLASM OF BREAST IN FEMALE, ESTROGEN RECEPTOR POSITIVE (HCC): Status: ACTIVE | Noted: 2022-09-01

## 2022-09-01 PROBLEM — Z17.0 MALIGNANT NEOPLASM OF BREAST IN FEMALE, ESTROGEN RECEPTOR POSITIVE (HCC): Status: ACTIVE | Noted: 2022-09-01

## 2022-09-01 ASSESSMENT — ENCOUNTER SYMPTOMS
HEMOPTYSIS: 0
SORE THROAT: 0
DIARRHEA: 0
VOICE CHANGE: 0
ABDOMINAL DISTENTION: 0
CONSTIPATION: 0
SCLERAL ICTERUS: 0
SHORTNESS OF BREATH: 0
NAUSEA: 0
CHEST TIGHTNESS: 0
BLOOD IN STOOL: 0
VOMITING: 0
TROUBLE SWALLOWING: 0
ABDOMINAL PAIN: 0
WHEEZING: 0

## 2022-09-08 ENCOUNTER — OFFICE VISIT (OUTPATIENT)
Dept: SURGERY | Age: 62
End: 2022-09-08
Payer: COMMERCIAL

## 2022-09-08 VITALS
HEART RATE: 87 BPM | WEIGHT: 115.2 LBS | BODY MASS INDEX: 19.77 KG/M2 | SYSTOLIC BLOOD PRESSURE: 114 MMHG | DIASTOLIC BLOOD PRESSURE: 77 MMHG

## 2022-09-08 DIAGNOSIS — C50.912 DUCTAL CARCINOMA OF LEFT BREAST (HCC): Primary | ICD-10-CM

## 2022-09-08 DIAGNOSIS — Z78.0 POSTMENOPAUSAL STATE: ICD-10-CM

## 2022-09-08 PROCEDURE — 99204 OFFICE O/P NEW MOD 45 MIN: CPT | Performed by: SURGERY

## 2022-09-08 ASSESSMENT — ENCOUNTER SYMPTOMS
ABDOMINAL PAIN: 0
ABDOMINAL DISTENTION: 0
COUGH: 0
COLOR CHANGE: 0
EYE ITCHING: 0
EYE DISCHARGE: 0
VOMITING: 0
WHEEZING: 0
SINUS PAIN: 0
SHORTNESS OF BREATH: 0
SINUS PRESSURE: 0
EYE PAIN: 0
NAUSEA: 0
BACK PAIN: 0

## 2022-09-08 NOTE — PROGRESS NOTES
file    Years of education: Not on file    Highest education level: Not on file   Occupational History    Not on file   Tobacco Use    Smoking status: Never    Smokeless tobacco: Never   Substance and Sexual Activity    Alcohol use: No    Drug use: No    Sexual activity: Not on file   Other Topics Concern    Not on file   Social History Narrative    Not on file     Social Determinants of Health     Financial Resource Strain: Not on file   Food Insecurity: Not on file   Transportation Needs: Not on file   Physical Activity: Not on file   Stress: Not on file   Social Connections: Not on file   Intimate Partner Violence: Not on file   Housing Stability: Not on file             Review of Systems   Constitutional:  Negative for chills, fatigue and fever. HENT:  Negative for ear pain, sinus pressure and sinus pain. Eyes:  Negative for pain, discharge and itching. Contact lens   Respiratory:  Negative for cough, shortness of breath and wheezing. Cardiovascular:  Negative for chest pain and palpitations. Gastrointestinal:  Negative for abdominal distention, abdominal pain, nausea and vomiting. Endocrine: Positive for heat intolerance (hot flashes since stopping ERT patch). Negative for cold intolerance and polydipsia. Genitourinary:  Negative for difficulty urinating, dysuria and pelvic pain. Musculoskeletal:  Negative for back pain, joint swelling and myalgias. Skin:  Negative for color change and pallor. Allergic/Immunologic: Negative for environmental allergies and food allergies. Neurological:  Negative for dizziness, light-headedness and headaches. Hematological:  Bruises/bleeds easily. Psychiatric/Behavioral:  Negative for behavioral problems and sleep disturbance. The patient is not nervous/anxious. Physical Exam    /77   Pulse 87   Wt 115 lb 3.2 oz (52.3 kg)   BMI 19.77 kg/m²   General Appearance:   In no acute distress   Ears/Nose/Mouth/Throat:   Hearing grossly normal.         Neck: Supple. Chest:   Lungs clear to auscultation bilaterally. Cardiovascular:  Regular rate and rhythm, S1, S2 normal, no murmur. Abdomen:   Soft. Extremities: No gross deformity    Neuro: alert and oriented x 3              Mammogram Result (most recent): MAMMOGRAM POST BX CLIP PLACEMENT RIGHT 08/16/2022    Narrative  POSTBIOPSY MAMMOGRAM, 8/16/2022. CLINICAL HISTORY: Status post percutaneous biopsy. FINDINGS:    CC, ML views of the right breast demonstrate the biopsy clip in the medial soft  tissues of the right breast. No significant post biopsy hematoma is seen. There  has been successful placement of the biopsy clip occurring within the inferior  aspect of the previously described indeterminate mass. Impression  1. Successful biopsy and placement of marker clip. This is a post biopsy mammogram and does not require BI-RADS categorization. ASSESSMENT and PLAN:    1. Ductal carcinoma of left breast ([facility] [address]    2. Postmenopausal state         Discussed pathology in detail with ptand . ER status also reviewed and we discussed how this may modify her post-surgical management including treatment with anti-estrogen agents and the need to stop any hormonal supplementation she may be taking. Discussed family history as it may relate to any value to investigation of a genetic basis for her cancer.-n/a     Discussed management options. Initial treatment should be surgical. Reviewed total vs. Partial mastectomy (w/ XRT) including different local recurrence rates but equivalent long term survival rates. Discussed potential indications for post-mastectomy XRT as well. After discussion, we will proceed with wire localized, right partial mastectomy with sentinel lymph node biopsy  . We have discussed the technical details of the procedure(s) in detail.   Risks reviewed include anesthetic risks, bleeding, infection, wound healing problems and cosmetic abnormalities, need for further surgical intervention depending on pathology reports, lymphedema if axillary procedure planned, and recurrence. All questions are answered.

## 2022-09-08 NOTE — H&P (VIEW-ONLY)
Lex Suggs MD, [facility], [address] [place] [address]  Kavya Perez  Phone [phone]   Fax [phone]      Date of visit: 9/14/2022     Primary/Requesting provider: Elio Gan MD    Chief Complaint   Patient presents with    New Patient     NP - Right breast IDC       Patient is a 58 y.o. female who presents for evaluation of her recent diagnosis of RIGHT breast cancer, with her  Mio Redd. She reports no antecedent symptoms- no breast pain, swelling, redness, skin thickening, nipple discharge or retraction. Her tumor was identified on routine mammogram, which prompted further evaluation and subsequent biopsy. She has already met with oncology Caty Moe with recommendation for up-front surgical therapy. She stopped her ERT patch about a month ago; she is experiencing hot flashes. Medications:   Current Outpatient Medications on File Prior to Visit   Medication Sig Dispense Refill    Multiple Vitamin (MULTIVITAMIN ADULT PO) Take 1 tablet by mouth daily      rosuvastatin (CRESTOR) 10 MG tablet       amitriptyline (ELAVIL) 25 MG tablet Take 25 mg by mouth      citalopram (CELEXA) 20 MG tablet Take 20 mg by mouth daily       No current facility-administered medications on file prior to visit.         Allergies: No Known Allergies     Past History:  Past Medical History:   Diagnosis Date    Anxiety     Endocrine disorder     Headache     Menopause       Past Surgical History:   Procedure Laterality Date    HYSTERECTOMY (CERVIX STATUS UNKNOWN)      FOR UTERINE PROLAPSE, DID NOT TAKE OVARIES    US BREAST NEEDLE BIOPSY RIGHT Right 8/16/2022    US BREAST NEEDLE BIOPSY RIGHT 8/16/2022 [facility] MAMMO        Family and Social History:  Family History   Problem Relation Age of Onset    Heart Disease Father     Heart Disease Brother     Breast Cancer Neg Hx      Social History     Socioeconomic History    Marital status:      Spouse name: Not on file    Number of children: Not on file    Years of education: Not on file    Highest education level: Not on file   Occupational History    Not on file   Tobacco Use    Smoking status: Never    Smokeless tobacco: Never   Substance and Sexual Activity    Alcohol use: No    Drug use: No    Sexual activity: Not on file   Other Topics Concern    Not on file   Social History Narrative    Not on file     Social Determinants of Health     Financial Resource Strain: Not on file   Food Insecurity: Not on file   Transportation Needs: Not on file   Physical Activity: Not on file   Stress: Not on file   Social Connections: Not on file   Intimate Partner Violence: Not on file   Housing Stability: Not on file             Review of Systems   Constitutional:  Negative for chills, fatigue and fever. HENT:  Negative for ear pain, sinus pressure and sinus pain. Eyes:  Negative for pain, discharge and itching. Contact lens   Respiratory:  Negative for cough, shortness of breath and wheezing. Cardiovascular:  Negative for chest pain and palpitations. Gastrointestinal:  Negative for abdominal distention, abdominal pain, nausea and vomiting. Endocrine: Positive for heat intolerance (hot flashes since stopping ERT patch). Negative for cold intolerance and polydipsia. Genitourinary:  Negative for difficulty urinating, dysuria and pelvic pain. Musculoskeletal:  Negative for back pain, joint swelling and myalgias. Skin:  Negative for color change and pallor. Allergic/Immunologic: Negative for environmental allergies and food allergies. Neurological:  Negative for dizziness, light-headedness and headaches. Hematological:  Bruises/bleeds easily. Psychiatric/Behavioral:  Negative for behavioral problems and sleep disturbance. The patient is not nervous/anxious. Physical Exam    /77   Pulse 87   Wt 115 lb 3.2 oz (52.3 kg)   BMI 19.77 kg/m²   General Appearance:   In no acute distress   Ears/Nose/Mouth/Throat:   Hearing grossly normal.         Neck: Supple. Chest:   Lungs clear to auscultation bilaterally. Cardiovascular:  Regular rate and rhythm, S1, S2 normal, no murmur. Abdomen:   Soft. Extremities: No gross deformity    Neuro: alert and oriented x 3              Mammogram Result (most recent): MAMMOGRAM POST BX CLIP PLACEMENT RIGHT 08/16/2022    Narrative  POSTBIOPSY MAMMOGRAM, 8/16/2022. CLINICAL HISTORY: Status post percutaneous biopsy. FINDINGS:    CC, ML views of the right breast demonstrate the biopsy clip in the medial soft  tissues of the right breast. No significant post biopsy hematoma is seen. There  has been successful placement of the biopsy clip occurring within the inferior  aspect of the previously described indeterminate mass. Impression  1. Successful biopsy and placement of marker clip. This is a post biopsy mammogram and does not require BI-RADS categorization. ASSESSMENT and PLAN:    1. Ductal carcinoma of left breast ([facility] [address]    2. Postmenopausal state         Discussed pathology in detail with ptand . ER status also reviewed and we discussed how this may modify her post-surgical management including treatment with anti-estrogen agents and the need to stop any hormonal supplementation she may be taking. Discussed family history as it may relate to any value to investigation of a genetic basis for her cancer.-n/a     Discussed management options. Initial treatment should be surgical. Reviewed total vs. Partial mastectomy (w/ XRT) including different local recurrence rates but equivalent long term survival rates. Discussed potential indications for post-mastectomy XRT as well. After discussion, we will proceed with wire localized, right partial mastectomy with sentinel lymph node biopsy  . We have discussed the technical details of the procedure(s) in detail.   Risks reviewed include anesthetic risks, bleeding, infection, wound healing problems and cosmetic abnormalities, need for further surgical intervention depending on pathology reports, lymphedema if axillary procedure planned, and recurrence. All questions are answered.

## 2022-09-12 ENCOUNTER — PREP FOR PROCEDURE (OUTPATIENT)
Dept: SURGERY | Age: 62
End: 2022-09-12

## 2022-09-13 DIAGNOSIS — C50.911 MALIGNANT NEOPLASM OF RIGHT BREAST IN FEMALE, ESTROGEN RECEPTOR POSITIVE, UNSPECIFIED SITE OF BREAST (HCC): Primary | ICD-10-CM

## 2022-09-13 DIAGNOSIS — Z17.0 MALIGNANT NEOPLASM OF RIGHT BREAST IN FEMALE, ESTROGEN RECEPTOR POSITIVE, UNSPECIFIED SITE OF BREAST (HCC): Primary | ICD-10-CM

## 2022-09-14 DIAGNOSIS — Z17.0 MALIGNANT NEOPLASM OF RIGHT BREAST IN FEMALE, ESTROGEN RECEPTOR POSITIVE, UNSPECIFIED SITE OF BREAST (HCC): Primary | ICD-10-CM

## 2022-09-14 DIAGNOSIS — C50.911 MALIGNANT NEOPLASM OF RIGHT BREAST IN FEMALE, ESTROGEN RECEPTOR POSITIVE, UNSPECIFIED SITE OF BREAST (HCC): Primary | ICD-10-CM

## 2022-09-21 NOTE — PERIOP NOTE
Patient verified name and . Order for consent IS NOT found in EHR; patient verifies procedure. Type 1b surgery, phone assessment complete. Orders not received. Labs per surgeon: none  Labs per anesthesia protocol: none    Patient answered medical/surgical history questions at their best of ability. All prior to admission medications documented in [facility]. Patient instructed to take the following medications the day of surgery according to anesthesia guidelines with a small sip of water: none   On the day before surgery please take Acetaminophen 1000mg in the morning and then again before bed. You may substitute for Tylenol 650 mg. Hold all vitamins 7 days prior to surgery and NSAIDS 5 days prior to surgery. Prescription meds to hold DOS: none  Patient instructed on the following:    > Arrive at [facility], time of arrival to be called the day before by 1700  > NPO after midnight, unless otherwise indicated, including gum, mints, and ice chips  > Responsible adult must drive patient to the hospital, stay during surgery, and patient will need supervision 24 hours after anesthesia  > Use antibacterial soap in shower the night before surgery and on the morning of surgery  > All piercings must be removed prior to arrival.    > Leave all valuables (money and jewelry) at home but bring insurance card and ID on DOS.   > You may be required to pay a deductible or co-pay on the day of your procedure. You can pre-pay by calling [phone] if your surgery is at the [facility] or [phone] if your surgery is at the [facility]. > Do not wear make-up, nail polish, lotions, cologne, perfumes, powders, or oil on skin. Artificial nails are not permitted.

## 2022-09-23 ENCOUNTER — APPOINTMENT (OUTPATIENT)
Dept: MAMMOGRAPHY | Age: 62
End: 2022-09-23
Payer: COMMERCIAL

## 2022-09-23 ENCOUNTER — ANESTHESIA EVENT (OUTPATIENT)
Dept: SURGERY | Age: 62
End: 2022-09-23
Payer: COMMERCIAL

## 2022-09-23 ENCOUNTER — ANESTHESIA (OUTPATIENT)
Dept: SURGERY | Age: 62
End: 2022-09-23
Payer: COMMERCIAL

## 2022-09-23 ENCOUNTER — APPOINTMENT (OUTPATIENT)
Dept: MAMMOGRAPHY | Age: 62
End: 2022-09-23
Attending: SURGERY
Payer: COMMERCIAL

## 2022-09-23 ENCOUNTER — HOSPITAL ENCOUNTER (OUTPATIENT)
Dept: NUCLEAR MEDICINE | Age: 62
End: 2022-09-23
Payer: COMMERCIAL

## 2022-09-23 ENCOUNTER — HOSPITAL ENCOUNTER (OUTPATIENT)
Age: 62
Setting detail: OUTPATIENT SURGERY
Discharge: HOME | End: 2022-09-23
Attending: SURGERY | Admitting: SURGERY
Payer: COMMERCIAL

## 2022-09-23 VITALS
BODY MASS INDEX: 20.57 KG/M2 | OXYGEN SATURATION: 100 % | TEMPERATURE: 98.2 F | HEART RATE: 85 BPM | RESPIRATION RATE: 16 BRPM | DIASTOLIC BLOOD PRESSURE: 66 MMHG | SYSTOLIC BLOOD PRESSURE: 111 MMHG | WEIGHT: 116.1 LBS | HEIGHT: 63 IN

## 2022-09-23 DIAGNOSIS — C50.911 MALIGNANT NEOPLASM OF RIGHT BREAST IN FEMALE, ESTROGEN RECEPTOR POSITIVE, UNSPECIFIED SITE OF BREAST (HCC): ICD-10-CM

## 2022-09-23 DIAGNOSIS — Z17.0 MALIGNANT NEOPLASM OF RIGHT BREAST IN FEMALE, ESTROGEN RECEPTOR POSITIVE, UNSPECIFIED SITE OF BREAST (HCC): ICD-10-CM

## 2022-09-23 PROCEDURE — 19285 PERQ DEV BREAST 1ST US IMAG: CPT

## 2022-09-23 PROCEDURE — 7100000010 HC PHASE II RECOVERY - FIRST 15 MIN: Performed by: SURGERY

## 2022-09-23 PROCEDURE — 3700000001 HC ADD 15 MINUTES (ANESTHESIA): Performed by: SURGERY

## 2022-09-23 PROCEDURE — 6360000002 HC RX W HCPCS: Performed by: NURSE ANESTHETIST, CERTIFIED REGISTERED

## 2022-09-23 PROCEDURE — 2720000010 HC SURG SUPPLY STERILE: Performed by: SURGERY

## 2022-09-23 PROCEDURE — 88305 TISSUE EXAM BY PATHOLOGIST: CPT

## 2022-09-23 PROCEDURE — 19301 PARTIAL MASTECTOMY: CPT | Performed by: SURGERY

## 2022-09-23 PROCEDURE — 3600000004 HC SURGERY LEVEL 4 BASE: Performed by: SURGERY

## 2022-09-23 PROCEDURE — 6360000002 HC RX W HCPCS: Performed by: SURGERY

## 2022-09-23 PROCEDURE — 3430000000 HC RX DIAGNOSTIC RADIOPHARMACEUTICAL: Performed by: SURGERY

## 2022-09-23 PROCEDURE — 2709999900 HC NON-CHARGEABLE SUPPLY: Performed by: SURGERY

## 2022-09-23 PROCEDURE — 6370000000 HC RX 637 (ALT 250 FOR IP): Performed by: ANESTHESIOLOGY

## 2022-09-23 PROCEDURE — 7100000000 HC PACU RECOVERY - FIRST 15 MIN: Performed by: SURGERY

## 2022-09-23 PROCEDURE — A9541 TC99M SULFUR COLLOID: HCPCS | Performed by: SURGERY

## 2022-09-23 PROCEDURE — 7100000001 HC PACU RECOVERY - ADDTL 15 MIN: Performed by: SURGERY

## 2022-09-23 PROCEDURE — 2580000003 HC RX 258: Performed by: ANESTHESIOLOGY

## 2022-09-23 PROCEDURE — 6360000002 HC RX W HCPCS: Performed by: ANESTHESIOLOGY

## 2022-09-23 PROCEDURE — 2500000003 HC RX 250 WO HCPCS: Performed by: ANESTHESIOLOGY

## 2022-09-23 PROCEDURE — 88307 TISSUE EXAM BY PATHOLOGIST: CPT

## 2022-09-23 PROCEDURE — 77065 DX MAMMO INCL CAD UNI: CPT

## 2022-09-23 PROCEDURE — 78195 LYMPH SYSTEM IMAGING: CPT

## 2022-09-23 PROCEDURE — 7100000011 HC PHASE II RECOVERY - ADDTL 15 MIN: Performed by: SURGERY

## 2022-09-23 PROCEDURE — 2500000003 HC RX 250 WO HCPCS: Performed by: NURSE ANESTHETIST, CERTIFIED REGISTERED

## 2022-09-23 PROCEDURE — 2500000003 HC RX 250 WO HCPCS: Performed by: REGISTERED NURSE

## 2022-09-23 PROCEDURE — 3600000014 HC SURGERY LEVEL 4 ADDTL 15MIN: Performed by: SURGERY

## 2022-09-23 PROCEDURE — 38525 BIOPSY/REMOVAL LYMPH NODES: CPT | Performed by: SURGERY

## 2022-09-23 PROCEDURE — 38900 IO MAP OF SENT LYMPH NODE: CPT | Performed by: SURGERY

## 2022-09-23 PROCEDURE — 2500000003 HC RX 250 WO HCPCS: Performed by: SURGERY

## 2022-09-23 PROCEDURE — 76098 X-RAY EXAM SURGICAL SPECIMEN: CPT

## 2022-09-23 PROCEDURE — 3700000000 HC ANESTHESIA ATTENDED CARE: Performed by: SURGERY

## 2022-09-23 RX ORDER — SODIUM CHLORIDE 0.9 % (FLUSH) 0.9 %
5-40 SYRINGE (ML) INJECTION EVERY 12 HOURS SCHEDULED
Status: DISCONTINUED | OUTPATIENT
Start: 2022-09-23 | End: 2022-09-23 | Stop reason: HOSPADM

## 2022-09-23 RX ORDER — FENTANYL CITRATE 50 UG/ML
100 INJECTION, SOLUTION INTRAMUSCULAR; INTRAVENOUS
Status: DISCONTINUED | OUTPATIENT
Start: 2022-09-23 | End: 2022-09-23 | Stop reason: HOSPADM

## 2022-09-23 RX ORDER — SODIUM CHLORIDE 0.9 % (FLUSH) 0.9 %
5-40 SYRINGE (ML) INJECTION PRN
Status: DISCONTINUED | OUTPATIENT
Start: 2022-09-23 | End: 2022-09-23 | Stop reason: HOSPADM

## 2022-09-23 RX ORDER — OXYCODONE HYDROCHLORIDE 5 MG/1
5 TABLET ORAL
Status: DISCONTINUED | OUTPATIENT
Start: 2022-09-23 | End: 2022-09-23 | Stop reason: HOSPADM

## 2022-09-23 RX ORDER — SODIUM CHLORIDE, SODIUM LACTATE, POTASSIUM CHLORIDE, CALCIUM CHLORIDE 600; 310; 30; 20 MG/100ML; MG/100ML; MG/100ML; MG/100ML
INJECTION, SOLUTION INTRAVENOUS CONTINUOUS
Status: DISCONTINUED | OUTPATIENT
Start: 2022-09-23 | End: 2022-09-23 | Stop reason: HOSPADM

## 2022-09-23 RX ORDER — HYDROCODONE BITARTRATE AND ACETAMINOPHEN 5; 325 MG/1; MG/1
1 TABLET ORAL EVERY 4 HOURS PRN
Qty: 10 TABLET | Refills: 0 | Status: SHIPPED | OUTPATIENT
Start: 2022-09-23 | End: 2022-09-28

## 2022-09-23 RX ORDER — ONDANSETRON 2 MG/ML
INJECTION INTRAMUSCULAR; INTRAVENOUS PRN
Status: DISCONTINUED | OUTPATIENT
Start: 2022-09-23 | End: 2022-09-23 | Stop reason: SDUPTHER

## 2022-09-23 RX ORDER — GLYCOPYRROLATE 0.2 MG/ML
INJECTION INTRAMUSCULAR; INTRAVENOUS PRN
Status: DISCONTINUED | OUTPATIENT
Start: 2022-09-23 | End: 2022-09-23 | Stop reason: SDUPTHER

## 2022-09-23 RX ORDER — LIDOCAINE HYDROCHLORIDE 20 MG/ML
INJECTION, SOLUTION EPIDURAL; INFILTRATION; INTRACAUDAL; PERINEURAL PRN
Status: DISCONTINUED | OUTPATIENT
Start: 2022-09-23 | End: 2022-09-23 | Stop reason: SDUPTHER

## 2022-09-23 RX ORDER — APREPITANT 40 MG/1
40 CAPSULE ORAL ONCE
Status: COMPLETED | OUTPATIENT
Start: 2022-09-23 | End: 2022-09-23

## 2022-09-23 RX ORDER — BUPIVACAINE HYDROCHLORIDE AND EPINEPHRINE 5; 5 MG/ML; UG/ML
INJECTION, SOLUTION EPIDURAL; INTRACAUDAL; PERINEURAL PRN
Status: DISCONTINUED | OUTPATIENT
Start: 2022-09-23 | End: 2022-09-23 | Stop reason: HOSPADM

## 2022-09-23 RX ORDER — HYDROMORPHONE HYDROCHLORIDE 1 MG/ML
0.5 INJECTION, SOLUTION INTRAMUSCULAR; INTRAVENOUS; SUBCUTANEOUS EVERY 5 MIN PRN
Status: DISCONTINUED | OUTPATIENT
Start: 2022-09-23 | End: 2022-09-23 | Stop reason: HOSPADM

## 2022-09-23 RX ORDER — LIDOCAINE HYDROCHLORIDE 10 MG/ML
1 INJECTION, SOLUTION INFILTRATION; PERINEURAL
Status: COMPLETED | OUTPATIENT
Start: 2022-09-23 | End: 2022-09-23

## 2022-09-23 RX ORDER — ACETAMINOPHEN 500 MG
1000 TABLET ORAL ONCE
Status: COMPLETED | OUTPATIENT
Start: 2022-09-23 | End: 2022-09-23

## 2022-09-23 RX ORDER — PROCHLORPERAZINE EDISYLATE 5 MG/ML
5 INJECTION INTRAMUSCULAR; INTRAVENOUS
Status: COMPLETED | OUTPATIENT
Start: 2022-09-23 | End: 2022-09-23

## 2022-09-23 RX ORDER — PROPOFOL 10 MG/ML
INJECTION, EMULSION INTRAVENOUS PRN
Status: DISCONTINUED | OUTPATIENT
Start: 2022-09-23 | End: 2022-09-23 | Stop reason: SDUPTHER

## 2022-09-23 RX ORDER — LIDOCAINE HYDROCHLORIDE 10 MG/ML
5 INJECTION, SOLUTION INFILTRATION; PERINEURAL ONCE
Status: COMPLETED | OUTPATIENT
Start: 2022-09-23 | End: 2022-09-23

## 2022-09-23 RX ORDER — HALOPERIDOL 5 MG/ML
1 INJECTION INTRAMUSCULAR
Status: DISCONTINUED | OUTPATIENT
Start: 2022-09-23 | End: 2022-09-23 | Stop reason: HOSPADM

## 2022-09-23 RX ORDER — SODIUM CHLORIDE 9 MG/ML
INJECTION, SOLUTION INTRAVENOUS PRN
Status: DISCONTINUED | OUTPATIENT
Start: 2022-09-23 | End: 2022-09-23 | Stop reason: HOSPADM

## 2022-09-23 RX ORDER — EPHEDRINE SULFATE/0.9% NACL/PF 50 MG/5 ML
SYRINGE (ML) INTRAVENOUS PRN
Status: DISCONTINUED | OUTPATIENT
Start: 2022-09-23 | End: 2022-09-23 | Stop reason: SDUPTHER

## 2022-09-23 RX ORDER — MIDAZOLAM HYDROCHLORIDE 2 MG/2ML
2 INJECTION, SOLUTION INTRAMUSCULAR; INTRAVENOUS
Status: COMPLETED | OUTPATIENT
Start: 2022-09-23 | End: 2022-09-23

## 2022-09-23 RX ORDER — IPRATROPIUM BROMIDE AND ALBUTEROL SULFATE 2.5; .5 MG/3ML; MG/3ML
1 SOLUTION RESPIRATORY (INHALATION)
Status: DISCONTINUED | OUTPATIENT
Start: 2022-09-23 | End: 2022-09-23 | Stop reason: HOSPADM

## 2022-09-23 RX ORDER — DEXAMETHASONE SODIUM PHOSPHATE 10 MG/ML
INJECTION INTRAMUSCULAR; INTRAVENOUS PRN
Status: DISCONTINUED | OUTPATIENT
Start: 2022-09-23 | End: 2022-09-23 | Stop reason: SDUPTHER

## 2022-09-23 RX ORDER — FENTANYL CITRATE 50 UG/ML
INJECTION, SOLUTION INTRAMUSCULAR; INTRAVENOUS PRN
Status: DISCONTINUED | OUTPATIENT
Start: 2022-09-23 | End: 2022-09-23 | Stop reason: SDUPTHER

## 2022-09-23 RX ADMIN — PHENYLEPHRINE HYDROCHLORIDE 100 MCG: 0.1 INJECTION, SOLUTION INTRAVENOUS at 11:33

## 2022-09-23 RX ADMIN — ACETAMINOPHEN 1000 MG: 500 TABLET, FILM COATED ORAL at 07:35

## 2022-09-23 RX ADMIN — SODIUM CHLORIDE, SODIUM LACTATE, POTASSIUM CHLORIDE, AND CALCIUM CHLORIDE: 600; 310; 30; 20 INJECTION, SOLUTION INTRAVENOUS at 10:35

## 2022-09-23 RX ADMIN — Medication 2000 MG: at 10:49

## 2022-09-23 RX ADMIN — FENTANYL CITRATE 100 MCG: 50 INJECTION, SOLUTION INTRAMUSCULAR; INTRAVENOUS at 10:57

## 2022-09-23 RX ADMIN — PROCHLORPERAZINE EDISYLATE 5 MG: 5 INJECTION INTRAMUSCULAR; INTRAVENOUS at 12:27

## 2022-09-23 RX ADMIN — SODIUM CHLORIDE, SODIUM LACTATE, POTASSIUM CHLORIDE, AND CALCIUM CHLORIDE: 600; 310; 30; 20 INJECTION, SOLUTION INTRAVENOUS at 11:28

## 2022-09-23 RX ADMIN — Medication 15 MG: at 11:09

## 2022-09-23 RX ADMIN — MIDAZOLAM HYDROCHLORIDE 2 MG: 1 INJECTION, SOLUTION INTRAMUSCULAR; INTRAVENOUS at 10:24

## 2022-09-23 RX ADMIN — Medication 242 MICRO CURIE: at 08:02

## 2022-09-23 RX ADMIN — Medication 10 MG: at 11:05

## 2022-09-23 RX ADMIN — GLYCOPYRROLATE 0.2 MG: 0.2 INJECTION, SOLUTION INTRAMUSCULAR; INTRAVENOUS at 11:11

## 2022-09-23 RX ADMIN — APREPITANT 40 MG: 40 CAPSULE ORAL at 09:30

## 2022-09-23 RX ADMIN — ONDANSETRON 4 MG: 2 INJECTION INTRAMUSCULAR; INTRAVENOUS at 10:52

## 2022-09-23 RX ADMIN — SODIUM CHLORIDE, SODIUM LACTATE, POTASSIUM CHLORIDE, AND CALCIUM CHLORIDE: 600; 310; 30; 20 INJECTION, SOLUTION INTRAVENOUS at 07:45

## 2022-09-23 RX ADMIN — PHENYLEPHRINE HYDROCHLORIDE 100 MCG: 0.1 INJECTION, SOLUTION INTRAVENOUS at 11:27

## 2022-09-23 RX ADMIN — DEXAMETHASONE SODIUM PHOSPHATE 10 MG: 10 INJECTION INTRAMUSCULAR; INTRAVENOUS at 10:52

## 2022-09-23 RX ADMIN — PROPOFOL 200 MG: 10 INJECTION, EMULSION INTRAVENOUS at 10:42

## 2022-09-23 RX ADMIN — Medication 244 MICRO CURIE: at 08:02

## 2022-09-23 RX ADMIN — PHENYLEPHRINE HYDROCHLORIDE 100 MCG: 0.1 INJECTION, SOLUTION INTRAVENOUS at 11:15

## 2022-09-23 RX ADMIN — LIDOCAINE HYDROCHLORIDE 5 ML: 10 INJECTION, SOLUTION INFILTRATION; PERINEURAL at 09:52

## 2022-09-23 RX ADMIN — Medication 15 MG: at 11:12

## 2022-09-23 RX ADMIN — LIDOCAINE HYDROCHLORIDE 1 ML: 10 INJECTION, SOLUTION INFILTRATION; PERINEURAL at 07:45

## 2022-09-23 RX ADMIN — PHENYLEPHRINE HYDROCHLORIDE 100 MCG: 0.1 INJECTION, SOLUTION INTRAVENOUS at 11:21

## 2022-09-23 RX ADMIN — LIDOCAINE HYDROCHLORIDE 100 MG: 20 INJECTION, SOLUTION EPIDURAL; INFILTRATION; INTRACAUDAL; PERINEURAL at 10:42

## 2022-09-23 ASSESSMENT — PAIN - FUNCTIONAL ASSESSMENT: PAIN_FUNCTIONAL_ASSESSMENT: 0-10

## 2022-09-23 ASSESSMENT — PAIN SCALES - GENERAL: PAINLEVEL_OUTOF10: 0

## 2022-09-23 NOTE — ANESTHESIA PRE PROCEDURE
Department of Anesthesiology  Preprocedure Note       Name:  Heather Brand   Age:  58 y.o.  :  1960                                          MRN:  [id number]         Date:  2022      Surgeon: Loyda Abrams):  Karen Arcos MD    Procedure: Procedure(s):  WIRE LOCALIZED RIGHT BREAST PARTIAL MASTECTOMY  Pre-op : 6:30  Lympho:8:00  LOC:  9:15   Surgery:  11:04am  RIGHT BREAST SENTINEL LYMPH NODE BIOPSY    Medications prior to admission:   Prior to Admission medications    Medication Sig Start Date End Date Taking?  Authorizing Provider   Multiple Vitamin (MULTIVITAMIN ADULT PO) Take 1 tablet by mouth daily    Historical Provider, MD   rosuvastatin (CRESTOR) 10 MG tablet  22   Historical Provider, MD   amitriptyline (ELAVIL) 25 MG tablet Take 25 mg by mouth 18   Ar Automatic Reconciliation   citalopram (CELEXA) 20 MG tablet Take 20 mg by mouth daily 18   Ar Automatic Reconciliation       Current medications:    Current Facility-Administered Medications   Medication Dose Route Frequency Provider Last Rate Last Admin    fentaNYL (SUBLIMAZE) injection 100 mcg  100 mcg IntraVENous Once PRN Beata Díaz MD        lactated ringers infusion   IntraVENous Continuous Beata Díaz  mL/hr at 22 0745 New Bag at 22 0745    sodium chloride flush 0.9 % injection 5-40 mL  5-40 mL IntraVENous 2 times per day Beata Díaz MD        sodium chloride flush 0.9 % injection 5-40 mL  5-40 mL IntraVENous PRN Beata Díaz MD        0.9 % sodium chloride infusion   IntraVENous PRN Beata Díaz MD        midazolam PF (VERSED) injection 2 mg  2 mg IntraVENous Once PRN Beata Díaz MD        ceFAZolin (ANCEF) 2000 mg in sterile water 20 mL IV syringe  2,000 mg IntraVENous On Call Karen Arcos MD           Allergies:  No Known Allergies    Problem List:    Patient Active Problem List   Diagnosis Code    Postmenopausal HRT (hormone replacement therapy) Z79.890    Anxiety F41.9  Chronic headaches R51.9, G89.29    Malignant neoplasm of breast in female, estrogen receptor positive ([facility] C50.919, Z17.0       Past Medical History:        Diagnosis Date    Anxiety     Difficult intubation     Endocrine disorder     Headache     Menopause     PONV (postoperative nausea and vomiting)        Past Surgical History:        Procedure Laterality Date    COLONOSCOPY      HYSTERECTOMY (CERVIX STATUS UNKNOWN)      FOR UTERINE PROLAPSE, DID NOT TAKE OVARIES    US BREAST NEEDLE BIOPSY RIGHT Right 08/16/2022    US BREAST NEEDLE BIOPSY RIGHT 8/16/2022 [facility]       Social History:    Social History     Tobacco Use    Smoking status: Never    Smokeless tobacco: Never   Substance Use Topics    Alcohol use: No                                Counseling given: Not Answered      Vital Signs (Current):   Vitals:    09/21/22 0950 09/23/22 0700   BP:  (!) 122/92   Pulse:  89   Resp:  18   Temp:  98.1 °F (36.7 °C)   TempSrc:  Temporal   SpO2:  97%   Weight: 115 lb (52.2 kg) 116 lb 1.6 oz (52.7 kg)   Height: 5' 3\" (1.6 m) 5' 3\" (1.6 m)                                              BP Readings from Last 3 Encounters:   09/23/22 (!) 122/92   09/08/22 114/77   08/26/22 110/70       NPO Status: Time of last liquid consumption: 2000                        Time of last solid consumption: 2000                        Date of last liquid consumption: 09/22/22                        Date of last solid food consumption: 09/22/22    BMI:   Wt Readings from Last 3 Encounters:   09/23/22 116 lb 1.6 oz (52.7 kg)   09/08/22 115 lb 3.2 oz (52.3 kg)   08/26/22 118 lb 1.6 oz (53.6 kg)     Body mass index is 20.57 kg/m².     CBC: No results found for: WBC, RBC, HGB, HCT, MCV, RDW, PLT    CMP: No results found for: NA, K, CL, CO2, BUN, CREATININE, GFRAA, AGRATIO, LABGLOM, GLUCOSE, GLU, PROT, CALCIUM, BILITOT, ALKPHOS, AST, ALT    POC Tests: No results for input(s): POCGLU, POCNA, POCK, POCCL, POCBUN, POCHEMO, POCHCT in the last 72 hours. Coags: No results found for: PROTIME, INR, APTT    HCG (If Applicable): No results found for: PREGTESTUR, PREGSERUM, HCG, HCGQUANT     ABGs: No results found for: PHART, PO2ART, XOA1OJN, NYF1KCH, BEART, C4ARAYCC     Type & Screen (If Applicable):  No results found for: LABABO, LABRH    Drug/Infectious Status (If Applicable):  No results found for: HIV, HEPCAB    COVID-19 Screening (If Applicable): No results found for: COVID19        Anesthesia Evaluation  Patient summary reviewed and Nursing notes reviewed   history of anesthetic complications (sore throat after hysterectomy): PONV. Airway: Mallampati: III  TM distance: <3 FB   Neck ROM: full  Mouth opening: > = 3 FB   Dental: normal exam         Pulmonary:Negative Pulmonary ROS breath sounds clear to auscultation                             Cardiovascular:Negative CV ROS  Exercise tolerance: good (>4 METS),           Rhythm: regular  Rate: normal                    Neuro/Psych:   (+) headaches:, depression/anxiety             GI/Hepatic/Renal: Neg GI/Hepatic/Renal ROS            Endo/Other: Negative Endo/Other ROS                    Abdominal:             Vascular: negative vascular ROS. Other Findings:           Anesthesia Plan      general     ASA 2     (Add emend for PONV  Plan LMA)  Induction: intravenous. Anesthetic plan and risks discussed with patient and spouse.                         Brent Jones MD   9/23/2022

## 2022-09-23 NOTE — OP NOTE
Operative Note    Date of Surgery: 9/23/2022    Preoperative Diagnosis: Malignant neoplasm of breast in female, estrogen receptor positive, unspecified laterality, unspecified site of breast ([facility] [C50.919, Z17.0]     Postoperative Diagnosis: * No post-op diagnosis entered *     Surgeon(s) and Role:     * Scottie Tracy MD - Primary      Anesthesia: General    Procedure:   Procedure(s):  WIRE LOCALIZED RIGHT BREAST PARTIAL MASTECTOMY  Pre-op : 6:30  Lympho:8:00  LOC:  9:15   Surgery:  11:04am  RIGHT AXILLARY (DEEP) SENTINEL LYMPH NODE BIOPSY    Indications:  As detailed in the H&P. Procedure in Detail:  The patient was taken to the operating room, identified as Susan Garcia, and the procedure verified. A Time Out was held and the above information confirmed. Prior to the operative procedure,  Susan Garcia underwent a right breast technetium nuclear medicine scan. The technetium lymphoscintigraphy showed uptake the axilla. Wire localization of her prior biopsy clip was done at the breast center; images revealed the apex of the hook of the wire to be superficial and cephalad to the clip. The patient was then brought to the operating room and placed on the table in a supine position with adequate padding to all pressure points and the arm extended laterally. After induction of anesthesia,  the chest and arm were then prepped and draped in the usual sterile manner. The Neoprobe was used to sweep the  axilla to confirm activity. Attention was then turned to the breast.  A skin-crease incision was made in the area of the wire. This was carried down with small superior and inferior flaps of subcutaneous tissues. The wire was exposed to just superficial to the area of interest, where the area was excised circumferentially, marked in the usual manner, and imaged immediately for confirmation of clip capture and estimation of margins. The clip was present. Additional medial margin was excised. After discussion with radiology, while the clip was present, the mass itself could not be seen however it was felt it may be too small thus no additional tissue was removed. A curvilinear incision was then made in the low axilla and was carried down through the subcutaneous tissues with cautery. Careful sharp and blunt dissection was then used following visual and gamma probe cues to identify 1 deep axillary  nodes which were dissected from the surrounding deep axillary tissues using clips to control lymphatic and vascular structures to the node, and each node was sent separately to pathology; node(s) had max ex-vivo count of 8308cps. The probe was then used to sweep the axilla and no further activity >10% of lowest node count was noted. an additional incidentally removed node was submitted. Hemostasis was achieved in both sites. Both areas were infiltrated with local and closed with interrupted 3-0 Vicryl and 4-0 Vicryl subcuticular sutures. Steri-Strips were applied to the wound. Steristrips, telfa and tegaderm was placed over the axillary incision. A gentle pressure dressing was applied to the breast. Sponge and needle counts were correct times two. The patient tolerated the procedure well. The patient was transferred to recovery room in stable condition.                      Estimated Blood Loss: 30cc    Specimens:   ID Type Source Tests Collected by Time Destination   A : Right Breast partical mastectomy Tissue Breast SURGICAL PATHOLOGY Stas Mason MD 9/23/2022 1103    B : right breast additional medial margin  Tissue Breast SURGICAL PATHOLOGY Stas Mason MD 9/23/2022 1122    C : right breast sentinel node #1 Tissue Breast SURGICAL PATHOLOGY Stas Mason MD 9/23/2022 1134    D : right additional axillary tissue Tissue Breast SURGICAL PATHOLOGY Stas Mason MD 9/23/2022 1136         Signed By: Alvin Hoskins MD     September 23, 2022

## 2022-09-23 NOTE — DISCHARGE INSTRUCTIONS
Alistair Robb M.D.  [phone]    Instructions following Breast Surgery (lumpectomy, partial mastectomy)    ACTIVITY:  Try to take a few short walks with help around the house later today. It is very important to take short walks to avoid blood clots and pneumonia. You may be light-headed or sleepy from anesthesia, so be careful going up and down stairs. Avoid any activity that involves lifting your operative-side arm above your head until your follow-up appointment. We suggest wearing a tight-fitting bra (with or without a wire) continuously for the next 48 hours to minimize motion of the breast.    DIET:  Start with clear, non-carbonated liquids when you get home (sugar-free if you are diabetic), such as Gatorade, chicken broth, etc., and you may advance to regular foods as you feel able. PAIN:  You will be given a prescription for pain medication. Try to take the pain medication with food, even a few crackers. You may also use Tylenol, Motrin, Advil, or Aleve instead of the prescription pain medication. Do no take Tylenol and the prescription pain medication within 3 hours of each other. URINARY RETENTION: If you are unable to empty your bladder within 6-8 hours after returning home, please go to your nearest Emergency Department or Urgent Care for urinary catheterization. WOUND CARE:  Please keep the dressing dry and intact for 3 days after surgery. You may then remove the tape and gauze;  at this time it is fine to get the incision wet,  The steri-strips will probably remain on your skin for several more days. You may bathe prior to removing the dressing as long as the dressing remains dry  It is not uncommon for the breast to have a bruised appearance in the region of the surgery; this will clear over several days. Incisions will sometimes develop redness around them as well as a hard lumpy feel. If this area of redness continues to get larger, please call the office.     FOLLOW UP:  Your follow-up appointment is usually made when your surgery is arranged. Please call the office if you are not sure of this appointment. CALL THE DOCTOR IF:  You have a temperature higher than 101.5° Fahrenheit for more than 6 hours. You have severe nausea or vomiting. You develop increasing redness or signs of infection at the incision. Continue home medications as previously prescribed. After general anesthesia or intravenous sedation, for 24 hours or while taking prescription Narcotics:  Limit your activities  A responsible adult needs to be with you for the next 24 hours  Do not drive and operate hazardous machinery  Do not make important personal or business decisions  Do not drink alcoholic beverages  If you have not urinated within 8 hours after discharge, and you are experiencing discomfort from urinary retention, please go to the nearest ED. If you have sleep apnea and have a CPAP machine, please use it for all naps and sleeping. Please use caution when taking narcotics and any of your home medications that may cause drowsiness. *  Please give a list of your current medications to your Primary Care Provider. *  Please update this list whenever your medications are discontinued, doses are      changed, or new medications (including over-the-counter products) are added. *  Please carry medication information at all times in case of emergency situations. These are general instructions for a healthy lifestyle:  No smoking/ No tobacco products/ Avoid exposure to second hand smoke  Surgeon General's Warning:  Quitting smoking now greatly reduces serious risk to your health.   Obesity, smoking, and sedentary lifestyle greatly increases your risk for illness  A healthy diet, regular physical exercise & weight monitoring are important for maintaining a healthy lifestyle    You may be retaining fluid if you have a history of heart failure or if you experience any of the following symptoms: Weight gain of 3 pounds or more overnight or 5 pounds in a week, increased swelling in our hands or feet or shortness of breath while lying flat in bed. Please call your doctor as soon as you notice any of these symptoms; do not wait until your next office visit.

## 2022-09-23 NOTE — ANESTHESIA POSTPROCEDURE EVALUATION
Department of Anesthesiology  Postprocedure Note    Patient: Susan Garcia  MRN: [id number]  YOB: 1960  Date of evaluation: 9/23/2022      Procedure Summary     Date: 09/23/22 Room / Location: E MAIN OR 06 / E MAIN OR    Anesthesia Start: 1035 Anesthesia Stop: 1200    Procedures:       WIRE LOCALIZED RIGHT BREAST PARTIAL MASTECTOMY  Pre-op : 6:30  Lympho:8:00  LOC:  9:15   Surgery:  11:04am (Right: Breast)      RIGHT BREAST SENTINEL LYMPH NODE BIOPSY (Right) Diagnosis:       Malignant neoplasm of breast in female, estrogen receptor positive, unspecified laterality, unspecified site of breast ([facility]      (Malignant neoplasm of breast in female, estrogen receptor positive, unspecified laterality, unspecified site of breast ([facility] [C50.919, Z17.0])    Surgeons: Scotite Tracy MD Responsible Provider: Chris Adorno MD    Anesthesia Type: General ASA Status: 2          Anesthesia Type: General    Annalisa Phase I:      Annalisa Phase II:        Anesthesia Post Evaluation    Patient location during evaluation: PACU  Patient participation: complete - patient participated  Level of consciousness: awake  Airway patency: patent  Nausea & Vomiting: no nausea  Complications: no  Cardiovascular status: hemodynamically stable  Respiratory status: acceptable and nonlabored ventilation  Hydration status: stable  Multimodal analgesia pain management approach

## 2022-09-23 NOTE — INTERVAL H&P NOTE
Update History & Physical    The patient's History and Physical was reviewed with the patient and I examined the patient. There was no change. The surgical site was confirmed by the patient and me. Plan: The risks, benefits, expected outcome, and alternative to the recommended procedure have been discussed with the patient. Patient understands and wants to proceed with the procedure.      Colan Gaucher, MD  9/23/2022

## 2022-09-28 ENCOUNTER — CLINICAL DOCUMENTATION (OUTPATIENT)
Dept: CASE MANAGEMENT | Age: 62
End: 2022-09-28

## 2022-09-28 ENCOUNTER — TELEPHONE (OUTPATIENT)
Dept: SURGERY | Age: 62
End: 2022-09-28

## 2022-09-28 NOTE — TELEPHONE ENCOUNTER
Called to inform the pt of the path results. Pt voiced understanding. Placed in recall.      ----- Message from Lex Suggs MD sent at 9/28/2022 12:10 PM EDT -----  Nodes negative! Margins good!

## 2022-09-29 ENCOUNTER — OFFICE VISIT (OUTPATIENT)
Dept: SURGERY | Age: 62
End: 2022-09-29

## 2022-09-29 ENCOUNTER — CLINICAL DOCUMENTATION (OUTPATIENT)
Dept: CASE MANAGEMENT | Age: 62
End: 2022-09-29

## 2022-09-29 DIAGNOSIS — Z17.0 MALIGNANT NEOPLASM OF RIGHT BREAST IN FEMALE, ESTROGEN RECEPTOR POSITIVE, UNSPECIFIED SITE OF BREAST (HCC): Primary | ICD-10-CM

## 2022-09-29 DIAGNOSIS — C50.911 MALIGNANT NEOPLASM OF RIGHT BREAST IN FEMALE, ESTROGEN RECEPTOR POSITIVE, UNSPECIFIED SITE OF BREAST (HCC): Primary | ICD-10-CM

## 2022-09-29 PROCEDURE — 99024 POSTOP FOLLOW-UP VISIT: CPT | Performed by: SURGERY

## 2022-10-10 ENCOUNTER — TELEPHONE (OUTPATIENT)
Dept: SURGERY | Age: 62
End: 2022-10-10

## 2022-10-10 NOTE — TELEPHONE ENCOUNTER
Called to inform the pt of the pathology results.  Pt voiced understanding.        ----- Message from Anatoly Morrell MD sent at 10/7/2022  8:39 AM EDT -----  Margins good, node negative!!!

## 2022-10-12 ASSESSMENT — ENCOUNTER SYMPTOMS
ABDOMINAL DISTENTION: 0
ABDOMINAL PAIN: 0
HEMOPTYSIS: 0
NAUSEA: 0
VOICE CHANGE: 0
SORE THROAT: 0
DIARRHEA: 0
TROUBLE SWALLOWING: 0
SHORTNESS OF BREATH: 0
CHEST TIGHTNESS: 0
VOMITING: 0
BLOOD IN STOOL: 0
SCLERAL ICTERUS: 0
CONSTIPATION: 0
WHEEZING: 0

## 2022-10-13 NOTE — PROGRESS NOTES
[employer] [facility]: Established patient - follow up     Chief Complaint   Patient presents with    Follow-up     Reason for Referral: Breast cancer  Referring Provider:  Ethan Gonzalez MD  Family History of Cancer/Hematologic Disorders: None noted   Presenting Symptoms: abnormal routine screening mammogram    History of Present Illness:  Ms. Korina Almeida is a 58 y.o. female who presents today for follow up regarding breast cancer. The past medical history is significant for anxiety, endocrine disorder, headache and menopause. Sxhx: hysterectomy and breast biopsy. In July 2022, Ms. Korina Almeida presented for her routine screening mammogram. The imaging reported a right breast mass. On 8/11/22 she had a right breast US that reported a 5 mm solid mass in the breast. On 8/16/22 she underwent a core needle biopsy of the mass. The pathology reported the right breast mass as infiltrating ductal carcinoma, low grade (well differentiated). Definite in situ component and lymphovascular invasion were not identified. Her IHC staining reported ER (97%) and LA (93%) as positive and HER-2 (+1) as negative. A referral was placed to medical oncology for next steps in the plan of care for her newly diagnosed right breast cancer. She has an appointment for surgical consult with Dr Yuly Diana on 9/8/22. never used tobacco products. She denies use of alcohol or drug substances. At consultation, we discussed the pathophysiology of breast cancer, staging, and the importance of receptor status in terms of treatment options. We then reviewed her medical history as well as oncologic history, recent imaging and pathology in detail. We discussed next steps in management. Today, she is here for FU after surgery. We discussed her OncotypDx results that showed low risk of recurrent disease w chemotherapy benefit of <1%. We also reviewed next steps which will be radiation. She has an apt.   She is a candidate for endo therapy and we reviewed this in detail today. MOA discussed and SE reviewed in detail - for PEREA and AIs. She was given printed info re both for her review too. No current s/s of infection. She has a RUQ discomfort/under the ribs and wishes to p/w labs to eval LFts post sx. Can also try Voltaren gel to see if the sensation goes away. No other GI s/s. Does not drink alcohol. Chronological Events:   21 Screening Mammogram  Impression       NO SPECIFIC MAMMOGRAPHIC EVIDENCE FOR MALIGNANCY. FOLLOW UP BILATERAL SCREENING   MAMMOGRAPHY IS RECOMMENDED IN ONE YEAR.       22 Screening Mammogram  Impression   Right mass for which further evaluation is recommended with targeted   ultrasound. 22 Right Breast Ultrasound  Impression   5 mm solid mass in the 2:00 right breast at 6 cm from the nipple. An   ultrasound-guided biopsy is recommended. 2022 Surgical Pathology  DIAGNOSIS        A:  \" RIGHT BREAST, 2:00 POSITION, 6 CM FROM NIPPLE, CORE BIOPSY\":         INFILTRATING DUCTAL CARCINOMA, LOW GRADE (WELL DIFFERENTIATED).    DEFINITE IN SITU COMPONENT AND LYMPHOVASCULAR INVASION ARE NOT IDENTIFIED   Procedures/Addenda                     STF- ER/AR/JRQ5APS BY IHC                          Interpretation                       [facility] IHC Quantitative Breast Panel     TEST NAME:                                          RESULTS:               INTERNAL CONTROLS:     ESTROGEN RECEPTOR:                     Positive (97%)               Present, Positive   PROGESTERONE RECEPTOR:           Positive (93%)               Present, Positive   HER-2/RAMONA:                                           Negative (1+)               Percentage of Cells with Uniform                                                                                                               Intense Complete Membrane                                                                                                               Stainin% 8/26/22 heme/onc consultation   9/23/22 wire loc lumpectomy and right axill SLNbx   9/29/22 post op tumor board  10/17/22 FU after sx - discussed OncotypeDx results; rad onc next followed by endo therapy       Family History   Problem Relation Age of Onset    Heart Disease Father     Heart Disease Brother     Breast Cancer Neg Hx       Social History     Socioeconomic History    Marital status:      Spouse name: None    Number of children: None    Years of education: None    Highest education level: None   Tobacco Use    Smoking status: Never    Smokeless tobacco: Never   Substance and Sexual Activity    Alcohol use: No    Drug use: No        Review of Systems   Constitutional:  Negative for appetite change, chills, diaphoresis, fatigue, fever and unexpected weight change. HENT:   Negative for hearing loss, mouth sores, nosebleeds, sore throat, trouble swallowing and voice change. Eyes:  Negative for icterus. Respiratory:  Negative for chest tightness, hemoptysis, shortness of breath and wheezing. Cardiovascular:  Negative for chest pain, leg swelling and palpitations. Gastrointestinal:  Negative for abdominal distention, abdominal pain, blood in stool, constipation, diarrhea, nausea and vomiting. Endocrine: Negative for hot flashes. Genitourinary:  Negative for difficulty urinating, frequency, vaginal bleeding and vaginal discharge. Musculoskeletal:  Negative for arthralgias, flank pain, gait problem and myalgias. Skin:  Negative for itching, rash and wound. Neurological:  Negative for dizziness, extremity weakness, gait problem, headaches and numbness. Psychiatric/Behavioral:  Positive for depression. Negative for confusion. The patient is nervous/anxious.        No Known Allergies  Past Medical History:   Diagnosis Date    Anxiety     Difficult intubation     Endocrine disorder     Headache     Menopause     PONV (postoperative nausea and vomiting)      Past Surgical History: Procedure Laterality Date    BREAST BIOPSY Right 9/23/2022    WIRE LOCALIZED RIGHT BREAST PARTIAL MASTECTOMY  Pre-op : 6:30  Lympho:8:00  LOC:  9:15   Surgery:  11:04am performed by Joao Duran MD at [facility] Right 9/23/2022    RIGHT BREAST SENTINEL LYMPH NODE BIOPSY performed by Joao Duran MD at [address] [place] [address] (CERVIX STATUS UNKNOWN)      FOR UTERINE PROLAPSE, DID NOT TAKE OVARIES    US BREAST NEEDLE BIOPSY RIGHT Right 08/16/2022    US BREAST NEEDLE BIOPSY RIGHT 8/16/2022 [facility]    US GUIDED NEEDLE LOC OF RIGHT BREAST Right 9/23/2022    US GUIDED NEEDLE LOC OF RIGHT BREAST 9/23/2022 Dania Chang MD [facility]     Current Outpatient Medications   Medication Sig Dispense Refill    Multiple Vitamin (MULTIVITAMIN ADULT PO) Take 1 tablet by mouth daily      rosuvastatin (CRESTOR) 10 MG tablet       amitriptyline (ELAVIL) 25 MG tablet Take 25 mg by mouth      citalopram (CELEXA) 20 MG tablet Take 20 mg by mouth daily       No current facility-administered medications for this visit. No flowsheet data found. OBJECTIVE:  /85 (Site: Left Upper Arm, Position: Sitting, Cuff Size: Medium Adult)   Pulse 90   Temp 98.4 °F (36.9 °C) (Oral)   Resp 16   Ht 5' 4\" (1.626 m)   Wt 118 lb 6.4 oz (53.7 kg)   SpO2 99%   BMI 20.32 kg/m²       ECOG PERFORMANCE STATUS - 0-Fully active, able to carry on all pre-disease performance without restriction. Pain - 0 - No pain/10. None/Minimal pain - not affecting QOL     Fatigue - No flowsheet data found. Distress - No flowsheet data found. Physical Exam  Vitals reviewed. Exam conducted with a chaperone present. Constitutional:       General: She is not in acute distress. Appearance: Normal appearance. She is normal weight. She is not ill-appearing or toxic-appearing. HENT:      Head: Normocephalic and atraumatic.       Nose: Nose normal.      Mouth/Throat:      Mouth: Mucous membranes are moist.   Eyes:      General: No scleral icterus. Extraocular Movements: Extraocular movements intact. Conjunctiva/sclera: Conjunctivae normal.      Pupils: Pupils are equal, round, and reactive to light. Cardiovascular:      Rate and Rhythm: Normal rate and regular rhythm. Heart sounds: No murmur heard. Pulmonary:      Effort: Pulmonary effort is normal. No respiratory distress. Breath sounds: Normal breath sounds. No wheezing or rales. Chest:      Comments: Post sx changes - helaed very well   No axillary LAD   Abdominal:      General: There is no distension. Palpations: Abdomen is soft. Tenderness: There is no abdominal tenderness. Musculoskeletal:         General: Normal range of motion. Cervical back: Normal range of motion. Right lower leg: No edema. Left lower leg: No edema. Lymphadenopathy:      Cervical: No cervical adenopathy. Upper Body:      Right upper body: No supraclavicular or axillary adenopathy. Left upper body: No supraclavicular or axillary adenopathy. Skin:     General: Skin is warm and dry. Coloration: Skin is not jaundiced or pale. Findings: No rash. Neurological:      General: No focal deficit present. Mental Status: She is alert and oriented to person, place, and time. Gait: Gait normal.   Psychiatric:         Behavior: Behavior normal.         Thought Content: Thought content normal.        Labs:  No results found for this or any previous visit (from the past 168 hour(s)). Imaging: reviewed     PATHOLOGY:              9/2022           10/2022       ASSESSMENT:     Diagnosis Orders   1. Malignant neoplasm of breast in female, estrogen receptor positive, unspecified laterality, unspecified site of breast [facility])  Comprehensive Metabolic Panel            Ms. Alexis Segovia is here for evaluation of breast cancer.       Right breast IDC, low grade, ER97/PR93 and Her2 +1, negative, s/p core bx - 7mm, no DCIS or LVI   - S/p lumpectomy - 3mm residual disease, no LN involvement   - s/p OncotypeDx (7mm on bx) - low score, benefit of chemo <1%, risk of distant recurrent disease 4% at 9 years on endo tx, score 16    - here for FU after surgery. We discussed her OncotypDx results that showed low risk of recurrent disease w chemotherapy benefit of <1%. We also reviewed next steps which will be radiation. She has an apt. - She is a candidate for endo therapy and we reviewed this in detail today. MOA discussed and SE reviewed in detail - for PEREZ and AIs. She was given printed info re both for her review too. - AIs can cause hot flashes, HTN, angina, swelling, fatigue, bone thinning leading to osteoporosis/fractures, joint stiffness, N/V, hair thinning, weight changes, thrombosis and worsening depression to name a few. Perez can result in vision changes/cataracts, VTE, uterine cancer (pt s/p hysterectomy)  - hot flashes - will start venlafaxine 37/5 mg - she will keep track of nr per 24 before tx and after; on amitriptyline - interaction noted; pt educated re serotonin syndrome s/s by KL.    - RUQ discomfort/under the ribs and wishes to p/w labs to eval LFts post sx. Can also try Voltaren gel to see if the sensation goes away. No other GI s/s. Does not drink alcohol.        RESUSCITATION DIRECTIVES/HOSPICE CARE: Full Support    RTC after xrt or sooner as needed     MDM  Number of Diagnoses or Management Options  Hot flashes: established, worsening  Malignant neoplasm of breast in female, estrogen receptor positive, unspecified laterality, unspecified site of breast ([facility] [address]): established, improving  RUQ discomfort: new, needed workup     Amount and/or Complexity of Data Reviewed  Clinical lab tests: ordered and reviewed  Tests in the radiology section of CPT®: ordered and reviewed  Tests in the medicine section of CPT®: ordered  Review and summarize past medical records: yes  Independent visualization of images, tracings, or specimens: yes    Risk of Complications, Morbidity, and/or Mortality  Presenting problems: moderate  Diagnostic procedures: moderate  Management options: moderate      Lab studies and imaging studies were personally reviewed. Pertinent old records were reviewed. Historical:   - we discussed the pathophysiology of breast cancer, staging, and the importance of receptor status in terms of treatment options. We then reviewed her medical history as well as oncologic history, recent imaging and pathology in detail. We discussed next steps in management. She will p/w sx upfront  - role of OncotypeDx reviewed and it's prognostic/predictive value   - final staging will be determined at time of sx - she VU   - certainly a candidate for endo therapy - MOA and SE reviewed.    -advised to stop HRT, can try venlafaxine for hot flashes     All questions were asked and answered to the best of my ability. The patient verbalized understanding and agrees with the plan above.             [facility]) Ann Marie Vernon, [facility]  [address] [place] [address]  Office : [phone]  Fax : [phone]

## 2022-10-17 ENCOUNTER — CLINICAL DOCUMENTATION (OUTPATIENT)
Dept: ONCOLOGY | Age: 62
End: 2022-10-17

## 2022-10-17 ENCOUNTER — OFFICE VISIT (OUTPATIENT)
Dept: ONCOLOGY | Age: 62
End: 2022-10-17
Payer: COMMERCIAL

## 2022-10-17 ENCOUNTER — HOSPITAL ENCOUNTER (OUTPATIENT)
Dept: LAB | Age: 62
Discharge: HOME | End: 2022-10-20
Payer: COMMERCIAL

## 2022-10-17 VITALS
DIASTOLIC BLOOD PRESSURE: 85 MMHG | OXYGEN SATURATION: 99 % | HEIGHT: 64 IN | TEMPERATURE: 98.4 F | BODY MASS INDEX: 20.22 KG/M2 | RESPIRATION RATE: 16 BRPM | WEIGHT: 118.4 LBS | HEART RATE: 90 BPM | SYSTOLIC BLOOD PRESSURE: 121 MMHG

## 2022-10-17 DIAGNOSIS — Z17.0 MALIGNANT NEOPLASM OF BREAST IN FEMALE, ESTROGEN RECEPTOR POSITIVE, UNSPECIFIED LATERALITY, UNSPECIFIED SITE OF BREAST (HCC): Primary | ICD-10-CM

## 2022-10-17 DIAGNOSIS — Z17.0 MALIGNANT NEOPLASM OF BREAST IN FEMALE, ESTROGEN RECEPTOR POSITIVE, UNSPECIFIED LATERALITY, UNSPECIFIED SITE OF BREAST (HCC): ICD-10-CM

## 2022-10-17 DIAGNOSIS — R10.11 RUQ DISCOMFORT: ICD-10-CM

## 2022-10-17 DIAGNOSIS — C50.919 MALIGNANT NEOPLASM OF BREAST IN FEMALE, ESTROGEN RECEPTOR POSITIVE, UNSPECIFIED LATERALITY, UNSPECIFIED SITE OF BREAST (HCC): ICD-10-CM

## 2022-10-17 DIAGNOSIS — C50.919 MALIGNANT NEOPLASM OF BREAST IN FEMALE, ESTROGEN RECEPTOR POSITIVE, UNSPECIFIED LATERALITY, UNSPECIFIED SITE OF BREAST (HCC): Primary | ICD-10-CM

## 2022-10-17 DIAGNOSIS — R23.2 HOT FLASHES: ICD-10-CM

## 2022-10-17 LAB
ALBUMIN SERPL-MCNC: 3.9 G/DL (ref 3.2–4.6)
ALBUMIN/GLOB SERPL: 1.2 {RATIO} (ref 0.4–1.6)
ALP SERPL-CCNC: 60 U/L (ref 50–136)
ALT SERPL-CCNC: 40 U/L (ref 12–65)
ANION GAP SERPL CALC-SCNC: 4 MMOL/L (ref 2–11)
AST SERPL-CCNC: 27 U/L (ref 15–37)
BILIRUB SERPL-MCNC: 0.2 MG/DL (ref 0.2–1.1)
BUN SERPL-MCNC: 18 MG/DL (ref 8–23)
CALCIUM SERPL-MCNC: 9.2 MG/DL (ref 8.3–10.4)
CHLORIDE SERPL-SCNC: 107 MMOL/L (ref 101–110)
CO2 SERPL-SCNC: 28 MMOL/L (ref 21–32)
CREAT SERPL-MCNC: 0.9 MG/DL (ref 0.6–1)
GLOBULIN SER CALC-MCNC: 3.2 G/DL (ref 2.8–4.5)
GLUCOSE SERPL-MCNC: 92 MG/DL (ref 65–100)
POTASSIUM SERPL-SCNC: 4.4 MMOL/L (ref 3.5–5.1)
PROT SERPL-MCNC: 7.1 G/DL (ref 6.3–8.2)
SODIUM SERPL-SCNC: 139 MMOL/L (ref 133–143)

## 2022-10-17 PROCEDURE — 99214 OFFICE O/P EST MOD 30 MIN: CPT | Performed by: INTERNAL MEDICINE

## 2022-10-17 PROCEDURE — 80053 COMPREHEN METABOLIC PANEL: CPT

## 2022-10-17 PROCEDURE — 36415 COLL VENOUS BLD VENIPUNCTURE: CPT

## 2022-10-17 RX ORDER — VENLAFAXINE HYDROCHLORIDE 37.5 MG/1
37.5 CAPSULE, EXTENDED RELEASE ORAL DAILY
Qty: 30 CAPSULE | Refills: 1 | Status: SHIPPED | OUTPATIENT
Start: 2022-10-17

## 2022-10-17 ASSESSMENT — PATIENT HEALTH QUESTIONNAIRE - PHQ9
SUM OF ALL RESPONSES TO PHQ QUESTIONS 1-9: 0
SUM OF ALL RESPONSES TO PHQ9 QUESTIONS 1 & 2: 0
SUM OF ALL RESPONSES TO PHQ QUESTIONS 1-9: 0
1. LITTLE INTEREST OR PLEASURE IN DOING THINGS: 0
2. FEELING DOWN, DEPRESSED OR HOPELESS: 0

## 2022-10-17 NOTE — PATIENT INSTRUCTIONS
Patient Instructions from Today's Visit    Reason for Visit:  Follow up, breast cancer    Diagnosis Information:  [email or web address][email or web address] [email or web address]-us/asco-answers-patient-education-[email or web address]-gggfivv-tgqm-sheets      Plan:  After radiation we would like to see you back to start you on the endocrine therapy. This is the pill previously discussed. The goal is to lower estrogen/progesterone in the body to decrease the risk of re occurrence. Additional information on Tamoxifen  and letrozole is included in this printout   We are going to start you on Venflaxine for the hot flashes, please first take note of how many hot flashes you are having a day, and see if this improves. Voltaren gel, available OTC    Follow Up: In about 6-8 weeks, after you have finished radiation. We can adjust this appt as needed    Recent Lab Results:  N/A    Treatment Summary has been discussed and given to patient:         -------------------------------------------------------------------------------------------------------------------  Please call our office at [phone] if you have any  of the following symptoms:   Fever of 100.5 or greater  Chills  Shortness of breath  Swelling or pain in one leg    After office hours an answering service is available and will contact a provider for emergencies or if you are experiencing any of the above symptoms. Patient does express an interest in My Chart. My Chart log in information explained on the after visit summary printout at the . Charla Christine [address] desk. BRIDGETTE Pearce      Tamoxifen     Select Language?   (brian burns)   Trade names: [employer] com uses generic names in all descriptions of drugs. Novladex is the trade name for tamoxifen. In some cases, health care professionals may use the trade name Novladex when referring to the generic drug name tamoxifen. Drug type: Tamoxifen is a hormone therapy. This medication is classified as an \"anti-estrogen. \" (For more detail, see \"How this drug works\" section below). What this drug is used for:  Tamoxifen may be given as adjuvant therapy (treatment after successful surgery) in women or men with lymph node negative or lymph node positive breast cancer. Cancers with positive estrogen and progesterone receptors are more likely to benefit from tamoxifen. Tamoxifen reduces the risk of getting breast cancer in the opposite breast.   Tamoxifen may be prescribed in metastatic (cancer that has spread) breast cancer in both women and men. Tamoxifen may be prescribed in women with ductal carcinoma in situ (DCIS) who have completed surgery and radiation therapy. Tamoxifen may reduce the risk of invasive breast cancer. Risks and benefits of tamoxifen therapy should be discussed in this setting. Tamoxifen may be prescribed for women at high risk of breast cancer to reduce the incidence of developing breast cancer. Risks and benefits of tamoxifen therapy should be discussed in this setting. Tamoxifen may also be prescribed for treatment of ovarian cancer. Note: If a drug has been approved for one use, physicians may elect to use this same drug for other problems if they believe it may be helpful. How this drug is given:  Tamoxifen is a pill, given by mouth. The pill should be swallowed whole. Tamoxifen should be taken at about the same time each day with a full glass of water. If you miss a dose, do not take a double dose the next day. The amount of tamoxifen that you will receive depends on many factors, including your general health or other health problems, and the type of cancer or condition being treated. Your doctor will determine your dose, schedule and duration of treatment. Side effects:   Important things to remember about the side effects of tamoxifen:  Most people do not experience all of the side effects listed. Side effects are often predictable in terms of their onset and duration.    Side effects are almost always reversible and will go away after treatment is complete. There are many options to help minimize or prevent side effects. There is no relationship between the presence or severity of side effects and the effectiveness of the medication. The following side effects are common (occurring in greater than 30%) for patients taking tamoxifen: Hot flashes (see sexuality)   Vaginal discharge (see sexuality)   Swelling (fluid retention in feet, ankles, or hands)   Loss of libido (particularly in men) (see sexuality)  These side effects are less common side effects (occurring in about 10-29%) of patients receiving tamoxifen:  Nausea   Menstrual irregularities   Vaginal bleeding   Weight loss   Mood changes (see anxiety and/or depression)  A rare, but serious side effect of tamoxifen is blood clots, including deep vein thrombosis (DVT) and pulmonary embolus. You should seek emergency help and notify your health care provider immediately if you develop sudden chest pain and shortness of breath. Notify your health care provider within 24 hours if you notice that one leg is swollen, red, painful and/or warm to touch and the other is not. A rare, but serious side effect of tamoxifen can be the development of uterine cancer. Women who have not had a hysterectomy should have regular pap smears and gyn examinations. Abnormal vaginal bleeding should be reported to your health care provider. Your fertility, meaning your ability to conceive or father a child, may be affected by tamoxifen. Please discuss this issue with your health care provider. Not all side effects are listed above. Some that are rare (occurring in less than 10% of patients) are not listed here. However, you should always inform your health care provider if you experience any unusual symptoms.   When to contact your doctor or health care provider:  Seek emergency help immediately and notify your health care provider, it you experience the following symptoms:  Sudden shortness of breath and/or chest pain  The following symptoms require medical attention, but are not an emergency. Contact your health care provider within 24 hours of noticing any of the following:  Swelling, redness and/or pain in one leg or arm and not the other   New breast lumps   Excessive vaginal discharge or bleeding, menstrual (period) pain or irregularities   Nausea (interferes with ability to eat and unrelieved with prescribed medication)   Depression (interfering with your ability to carry on your regular activities)   Changes in vision  Always inform your health care provider if you experience any unusual symptoms. Precautions:   Before starting tamoxifen treatment, make sure you tell your doctor about any other medications you are taking (including prescription, over-the-counter, vitamins, herbal remedies, etc.). Do not take aspirin, or products containing aspirin unless your doctor specifically permits this. Let your health care professional know if you have ever had a blood clot that required medical treatment. Inform your health care professional if you are pregnant or may be pregnant prior to starting this treatment. Pregnancy category D (tamoxifen may be hazardous to the fetus. Women who are pregnant or become pregnant must be advised of the potential hazard to the fetus). For both men and women: Do not conceive a child (get pregnant) while taking tamoxifen. Barrier methods of contraception, such as condoms, are recommended. Discuss with your doctor when you may safely become pregnant or conceive a child after therapy. Do not breast feed while taking this medication. Self-care tips:  Do not stop taking this medication unless your healthcare provider tells you. You may be on it for as long as 5 years. If you are experiencing hot flashes, wearing light clothing, staying in a cool environment, and putting cool cloths on your head may reduce symptoms.  Consult you health care provider if these worsen, or become intolerable   This medication causes little nausea. But if you should experience nausea, take anti-nausea medications as prescribed by your doctor, and eat small frequent meals. Sucking on lozenges and chewing gum may also help. Avoid sun exposure. Wear SPF 13 (or higher) sunblock and protective clothing. In general, drinking alcoholic beverages should be kept to a minimum or avoided completely. You should discuss this with your doctor. Get plenty of rest.   Maintain good nutrition. If you experience symptoms or side effects, be sure to discuss them with your health care team. They can prescribe medications and/or offer other suggestions that are effective in managing such problems. Monitoring and testing: You will be checked regularly by your health care professional while you are taking tamoxifen, to monitor side effects and check your response to therapy. Periodic blood work to monitor your complete blood count (CBC) as well as the function of other organs (such as your kidneys and liver) may also be ordered by your doctor. Women will need a gynecologic (GYN) examination before therapy, and during therapy, at regular intervals. Discuss the appropriate schedule with your health care provider. How this drug works:   Hormones are chemical substances that are produced by glands in the body, which enter the bloodstream and cause effects in other tissues. For example, the hormone testosterone, made in the testicles and is responsible for male characteristics such as deepening voice and increased body hair. The use of hormone therapy to treat cancer is based on the observation that receptors for specific hormones that are needed for cell growth are on the surface of some tumor cells.  Hormone therapy can work by stopping the production of a certain hormone, blocking hormone receptors, or substituting chemically similar agents for the active hormone, which cannot be used by the tumor cell. The different types of hormone therapies are categorized by their function and/or the type of hormone that is affected. Tamoxifen is an antiestrogen. Antiestrogens bind to estrogen receptor site on cancer cells thus blocking estrogen from going into the cancer cell. This interferes with cell growth and eventually leads to cell death. The following are antiestrogen medications. tamoxifen, toremifene  Note: We strongly encourage you to talk with your health care professional about your specific medical condition and treatments. The information contained in this website is meant to be helpful and educational, but is not a substitute for medical advice. Letrozole        (LET martha zole)    Trade Name: [employer] is the trade name for the generic drug letrozole. In some cases, health care professionals may use the trade name  Femara ® when referring to the generic drug name letrozole. Drug Type:    Letrozole is a hormone therapy. Letrozole is classified as an aromatase inhibitor. (For more detail, see \"How Letrozole Works\" section below). What Letrozole Is Used For:  Letrozole is used to treat breast cancer in postmenopausal women. Note: If a drug has been approved for one use, physicians may elect to use this same drug for other problems if they believe it may be helpful. How Letrozole Is Given:  Letrozole is a pill, taken by mouth. You should take Letrozole at about the same time each day. You may take Letrozole with or without food. If you miss a dose, do not take a double dose the next day. You should not stop taking Letrozole without discussing with your physician. The amount of Letrozole that you will receive depends on many factors, including your general health or other health problems, and the type of cancer or condition being treated. Your doctor will determine your dose and how long you will be taking Letrozole.   Side Effects:  Important things to remember about the side effects of Letrozole:    Most people do not experience all of the side effects listed. Side effects are often predictable in terms of their onset and duration. Side effects are almost always reversible and will go away after treatment is complete. There are many options to help minimize or prevent side effects. There is no relationship between the presence or severity of side effects and the effectiveness of the medication. The following side effects are common (occurring in greater than 30%) for patients taking Letrozole: Hot flashes  High cholesterol  These side effects are less common side effects (occurring in about 10-29%) of patients receiving Letrozole:    Arthralgias (bone and joint pain)  Night sweats  Weight gain  Nausea  Not all side effects are listed above. Some that are rare (occurring in less than 10% of patients) are not listed here. However, you should always inform your health care provider if you experience any unusual symptoms. When to contact your doctor or health care provider: The following symptoms require medical attention, but are not an emergency. Contact your health care provider within 24 hours of noticing any of the following:    Symptoms of recurrent period (vaginal bleeding/spotting)  Always inform your health care provider if you experience any unusual symptoms. Precautions:  Before starting Letrozole treatment, make sure you tell your doctor about any other medications you are taking (including prescription, over-the-counter, vitamins, herbal remedies, etc.). Inform your health care professional if you are pregnant or may be pregnant prior to starting this treatment. Pregnancy category D (Letrozole may be hazardous to the fetus. Women who are pregnant or become pregnant must be advised of the potential hazard to the fetus). Letrozole is indicated for post-menopausal women. Do not conceive a child (get pregnant) while taking Letrozole. Barrier methods of contraception, such as condoms, are recommended. Discuss with your doctor when you may safely become pregnant or conceive a child after therapy. Do not breast feed while taking Letrozole. Self-Care Tips:  If you are experiencing hot flashes, wearing light clothing, staying in a cool environment, and putting cool cloths on your head may reduce symptoms. Consult your health care provider if these worsen, or become intolerable. Acetaminophen or ibuprofen may help relieve discomfort from generalized aches and pains. However, be sure to talk with your doctor before taking it. Letrozole causes little nausea. But if you should experience nausea, take anti-nausea medications as prescribed by your doctor, and eat small frequent meals. Sucking on lozenges and chewing gum may also help. Avoid sun exposure. Wear SPF 13 (or higher) sunblock and protective clothing. In general, drinking alcoholic beverages should be kept to a minimum or avoided completely. You should discuss this with your doctor. Get plenty of rest.   Maintain good nutrition. If you experience symptoms or side effects, be sure to discuss them with your health care team.  They can prescribe medications and/or offer other suggestions that are effective in managing such problems. Monitoring and Testing: You will be monitored regularly by your doctor while you are taking letrozole, but no special tests or blood tests are required. How Letrozole Works:  Hormones are chemical substances that are produced by glands in the body, which enter the bloodstream and cause effects in other tissues. For example, the hormone testosterone made in the testicles and is responsible for male characteristics such as deepening voice and increased body hair. The use of hormone therapy to treat cancer is based on the observation that receptors for specific hormones that are needed for cell growth are on the surface of some tumor cells.   Hormone therapies work by stopping the production of a certain hormone, blocking hormone receptors, or substituting chemically similar agents for the active hormone, which cannot be used by the tumor cell. The different types of hormone therapies are categorized by their function and/or the type of hormone that is affected. Letrozole is an aromatase inhibitor. This means it blocks the enzyme aromatase (found in the body's muscle, skin, breast and fat), which is used to convert androgens (hormones produced by the adrenal glands) into estrogen. In the absence of estrogen, tumors dependent on this hormone for growth will shrink. Note:  We strongly encourage you to talk with your health care professional about your specific medical condition and treatments. The information contained in this website is meant to be helpful and educational, but is not a substitute for medical advice.       Update 8/29/2012

## 2022-10-18 ENCOUNTER — HOSPITAL ENCOUNTER (OUTPATIENT)
Dept: RADIATION ONCOLOGY | Age: 62
Setting detail: RECURRING SERIES
Discharge: HOME | End: 2022-10-21
Payer: COMMERCIAL

## 2022-10-18 VITALS
WEIGHT: 119.3 LBS | OXYGEN SATURATION: 97 % | SYSTOLIC BLOOD PRESSURE: 129 MMHG | BODY MASS INDEX: 20.48 KG/M2 | HEART RATE: 80 BPM | TEMPERATURE: 98.1 F | DIASTOLIC BLOOD PRESSURE: 78 MMHG

## 2022-10-18 PROCEDURE — 99211 OFF/OP EST MAY X REQ PHY/QHP: CPT

## 2022-10-18 RX ORDER — CITALOPRAM 40 MG/1
40 TABLET ORAL DAILY
COMMUNITY

## 2022-10-18 NOTE — PROGRESS NOTES
Consult Right Breast Cancer. Lumpectomy 22. Oncotype score 16. F/u with Dr. Kathy Gonzalez 10-17-22. Pt lives in [place] and states she was told we had different treatment locations. She is  3/ Para 3. Started menses around age 15. History of Partial Hysterectomy-ovaries intact. Remote history of oral contraceptive use. History of Estradiol patch discontinued after diagnosis. Radiation teaching completed. Skin care sheet given and explained. CONSENTS SIGNED FOR RT.   CT SIM scheduled 10-24-22. Apt given to pt. Kristen Ponce.  Colt Amezquita

## 2022-10-18 NOTE — CONSULTS
Patient: Marcel Patrick MRN: [id number]  SSN: [id number]    YOB: 1960  Age: 58 y.o. Sex: female      Other Providers:  Dr. Sylvia Louise, Dr. Isa Avila: Abnormal screening mammogram    DIAGNOSIS: R breast pT1aN0 IDC, low grade, ER/ME positive, HER2/ roldan negative    PREVIOUS RADIATION TREATMENT:  None    HISTORY OF PRESENT ILLNESS:  Marcel Patrick is a 58 y.o. female who I am seeing at the request of Dr. Sylvia Louise. Ms. Alley Rojas was in her usual state of health until 7/29/22 at which time she underwent routine screening mammogram which demonstrated a right mass for which additional imaging was recommended. Ultrasound 8/11/22 confirmed a 5mm solid mass in the 2:00 right breast at 6cm from the nipple. Biopsy confirmed IDC, low grade, definite in situ component and LVI not identified. The lesion was ER positive (97%), ME positive (93%), HER2/ roldan negative (1+). On 9/23/22 she underwent lumpectomy and SLNB by Dr. Mallorie Zhou which demonstrated IDC, low grade, 3mm in greatest dimension, margins uninvolved. There was no DCIS component. The closest margin was <3mm from tumor. One sentinel lymph node was negative for malignancy and additional right breast axillary tissue also showed one lymph node, negative for tumor. Her OncoType was 16. She reports that she has recovered very well and was able to take a 2 week [facility] after [facility]. She denies any residual pain or difficulty with range of motion and her energy level is at baseline. PAST MEDICAL HISTORY:    Past Medical History:   Diagnosis Date    Anxiety     Difficult intubation     Endocrine disorder     Headache     Menopause     PONV (postoperative nausea and vomiting)      The patient denies history of collagen vascular diseases, pacemaker insertion, prior radiation or prior chemotherapy.      PAST SURGICAL HISTORY:   Past Surgical History:   Procedure Laterality Date    BREAST BIOPSY Right 9/23/2022    WIRE LOCALIZED RIGHT BREAST PARTIAL MASTECTOMY  Pre-op : 6:30  Lympho:8:00  LOC:  9:15   Surgery:  11:04am performed by Rory Murphy MD at [facility] Right 9/23/2022    RIGHT BREAST SENTINEL LYMPH NODE BIOPSY performed by Rory Murphy MD at [facility] (CERVIX STATUS UNKNOWN)      FOR UTERINE PROLAPSE, DID NOT TAKE OVARIES    US BREAST NEEDLE BIOPSY RIGHT Right 08/16/2022    US BREAST NEEDLE BIOPSY RIGHT 8/16/2022 [facility]    US GUIDED NEEDLE LOC OF RIGHT BREAST Right 9/23/2022    US GUIDED NEEDLE LOC OF RIGHT BREAST 9/23/2022 Shun Valdez MD [facility]     MEDICATIONS:     Current Outpatient Medications:     venlafaxine (EFFEXOR XR) 37.5 MG extended release capsule, Take 1 capsule by mouth daily, Disp: 30 capsule, Rfl: 1    Multiple Vitamin (MULTIVITAMIN ADULT PO), Take 1 tablet by mouth daily, Disp: , Rfl:     rosuvastatin (CRESTOR) 10 MG tablet, , Disp: , Rfl:     amitriptyline (ELAVIL) 25 MG tablet, Take 25 mg by mouth, Disp: , Rfl:     citalopram (CELEXA) 20 MG tablet, Take 20 mg by mouth daily, Disp: , Rfl:     ALLERGIES:   No Known Allergies    SOCIAL HISTORY:   Social History     Socioeconomic History    Marital status:      Spouse name: Not on file    Number of children: Not on file    Years of education: Not on file    Highest education level: Not on file   Occupational History    Not on file   Tobacco Use    Smoking status: Never    Smokeless tobacco: Never   Substance and Sexual Activity    Alcohol use: No    Drug use: No    Sexual activity: Not on file   Other Topics Concern    Not on file   Social History Narrative    Not on file     Social Determinants of Health     Financial Resource Strain: Not on file   Food Insecurity: Not on file   Transportation Needs: Not on file   Physical Activity: Not on file   Stress: Not on file   Social Connections: Not on file   Intimate Partner Violence: Not on file   Housing Stability: Not on file     FAMILY HISTORY:   Family History   Problem Relation Age of Onset    Heart Disease Father     Heart Disease Brother     Breast Cancer Neg Hx      REVIEW OF SYSTEMS:   A full 12-point review of systems was completed and was negative unless noted in the history of present illness. PHYSICAL EXAMINATION:   ECOG Performance status 0  VITAL SIGNS:   Vitals:    10/18/22 0930   BP: 129/78   Pulse: 80   Temp: 98.1 °F (36.7 °C)   SpO2: 97%      General: well developed/nourished adult Female in no acute distress; appears stated age  [de-identified]: normocephalic, atraumatic; EOMI  Neck: supple with full ROM; no cervical lymphadenopathy  Respiratory: normal inspiratory effort, no audible wheezes  Extremities: no cyanosis, clubbing, or edema  Musculoskeletal: mobility intact x4; normal ROM in all joints  Neuro: AOx3; sensation intact x 4; CNII-XII grossly intact  Psych: appropriate affect, insight, and judgement  GI: abdomen soft, non-distended  Breast: Breasts symmetric bilaterally without skin changes or nipple discharge. R breast axillary and lumpectomy incisions well healed without erythema, tenderness to palpation, or seroma. No palpable masses in the R or L breast.    PATHOLOGY:    I personally reviewed the pathology reports as documented in the HPI. LABORATORY:   Lab Results   Component Value Date/Time     10/17/2022 12:00 PM    K 4.4 10/17/2022 12:00 PM     10/17/2022 12:00 PM    CO2 28 10/17/2022 12:00 PM    BUN 18 10/17/2022 12:00 PM    GLOB 3.2 10/17/2022 12:00 PM    ALT 40 10/17/2022 12:00 PM     No results found for: WBC, HGB, HCT, PLT    RADIOLOGY:    I personally reviewed the images and agree with the findings as documented in the HPI. IMPRESSION:  Norman Dunbar is a 58 y.o. female with R breast pT1aN0 IDC, low grade, ER/MT positive, HER2/ roldan negative. I had a long discussion with Norman Dunbar regarding the role of radiation therapy after breast conservation surgery.  I recommended adjuvant radiation in order to minimize the risk of locoregional progression and improve overall survival. I discussed options including hypofractionated whole breast radiation with and without a boost, ultra hypofractionated whole breast radiation, and accelerated partial breast irradiation. Given her margins, age, and low grade disease I do not believe she would have significant benefit from a boost. I also recommended against ultra hypofractionated treatment given her relatively young age and short follow up for cosmetic and disease control outcomes in this study. I discussed ABPI in detail but ultimately given her margins <3mm I recommended hypofractionated whole breast radiation over 3 weeks as margins <5mm were an exclusion criteria in the [place] ABPI study. I reviewed in detail the anticipated acute and late toxicities of radiation therapy as well as  the logistics of treatment and expected disease control. Kevin Tracy had the opportunity to ask questions which appeared to be answered to her satisfaction and has elected to proceed. PLAN:    Consented patient for treatment with external beam radiation after discussing risk, benefits, and side effects from treatment. CT simulation to be scheduled and treatment to the whole breast over 3 weeks to start shortly thereafter.     Lana Smiley MD   October 18, 2022

## 2022-10-24 ENCOUNTER — HOSPITAL ENCOUNTER (OUTPATIENT)
Dept: RADIATION ONCOLOGY | Age: 62
Setting detail: RECURRING SERIES
Discharge: HOME | End: 2022-10-27
Payer: COMMERCIAL

## 2022-10-24 PROCEDURE — 77290 THER RAD SIMULAJ FIELD CPLX: CPT

## 2022-10-24 PROCEDURE — 77332 RADIATION TREATMENT AID(S): CPT

## 2022-10-26 ENCOUNTER — HOSPITAL ENCOUNTER (OUTPATIENT)
Dept: RADIATION ONCOLOGY | Age: 62
Setting detail: RECURRING SERIES
Discharge: HOME | End: 2022-10-29
Payer: COMMERCIAL

## 2022-10-26 PROCEDURE — 77334 RADIATION TREATMENT AID(S): CPT

## 2022-10-26 PROCEDURE — 77295 3-D RADIOTHERAPY PLAN: CPT

## 2022-10-26 PROCEDURE — 77300 RADIATION THERAPY DOSE PLAN: CPT

## 2022-11-03 ENCOUNTER — HOSPITAL ENCOUNTER (OUTPATIENT)
Dept: RADIATION ONCOLOGY | Age: 62
Setting detail: RECURRING SERIES
Discharge: HOME | End: 2022-11-06
Payer: COMMERCIAL

## 2022-11-03 ENCOUNTER — APPOINTMENT (OUTPATIENT)
Dept: RADIATION ONCOLOGY | Age: 62
End: 2022-11-03
Payer: COMMERCIAL

## 2022-11-03 PROCEDURE — 77412 RADIATION TX DELIVERY LVL 3: CPT

## 2022-11-03 PROCEDURE — 77280 THER RAD SIMULAJ FIELD SMPL: CPT

## 2022-11-04 ENCOUNTER — HOSPITAL ENCOUNTER (OUTPATIENT)
Dept: RADIATION ONCOLOGY | Age: 62
Setting detail: RECURRING SERIES
Discharge: HOME | End: 2022-11-07
Payer: COMMERCIAL

## 2022-11-04 PROCEDURE — 77412 RADIATION TX DELIVERY LVL 3: CPT

## 2022-11-07 ENCOUNTER — HOSPITAL ENCOUNTER (OUTPATIENT)
Dept: RADIATION ONCOLOGY | Age: 62
Setting detail: RECURRING SERIES
Discharge: HOME | End: 2022-11-10
Payer: COMMERCIAL

## 2022-11-07 PROCEDURE — 77412 RADIATION TX DELIVERY LVL 3: CPT

## 2022-11-07 NOTE — PROGRESS NOTES
RADIATION ONCOLOGY ON-TREATMENT VISIT  11/07/22    Diagnosis:  Cancer Staging  Malignant neoplasm of breast in female, estrogen receptor positive ([facility] [address]  Staging form: Breast, AJCC 8th Edition  - Clinical: cT1, cN0, cM0 - Signed by Kannan Mueller MD on 9/1/2022      This is a 58 y.o. female who is currently receiving adjuvant radiation therapy to the R breast.    Current RT dose: 8.01/40.05 Gy in 3/15 fractions. No concurrent systemic therapy    Subjective: Today, the patient reports no new symptoms. Objective: There were no vitals filed for this visit. Pain 0/10    General: Alert and conversant, in NAD    Assessment:  Patient is tolerating radiation therapy well with no current toxicities    Plan:  -Start moisturizer in the evening to minimize skin reaction.  -Follow up plan (6 month mammogram and rad onc follow up as needed) reviewed with the patient today as I will be out on maternity leave.  -Continue RT as planned. -Treatment images reviewed. -The patient has a documented plan of care to address pain. Pain is not present.     Awa Fuentes MD  11/07/22

## 2022-11-08 ENCOUNTER — HOSPITAL ENCOUNTER (OUTPATIENT)
Dept: RADIATION ONCOLOGY | Age: 62
Setting detail: RECURRING SERIES
Discharge: HOME | End: 2022-11-11
Payer: COMMERCIAL

## 2022-11-08 PROCEDURE — 77412 RADIATION TX DELIVERY LVL 3: CPT

## 2022-11-09 ENCOUNTER — HOSPITAL ENCOUNTER (OUTPATIENT)
Dept: RADIATION ONCOLOGY | Age: 62
Setting detail: RECURRING SERIES
Discharge: HOME | End: 2022-11-12
Payer: COMMERCIAL

## 2022-11-09 PROCEDURE — 77412 RADIATION TX DELIVERY LVL 3: CPT

## 2022-11-09 PROCEDURE — 77336 RADIATION PHYSICS CONSULT: CPT

## 2022-11-10 ENCOUNTER — HOSPITAL ENCOUNTER (OUTPATIENT)
Dept: RADIATION ONCOLOGY | Age: 62
Setting detail: RECURRING SERIES
Discharge: HOME | End: 2022-11-13
Payer: COMMERCIAL

## 2022-11-10 PROCEDURE — 77412 RADIATION TX DELIVERY LVL 3: CPT

## 2022-11-11 ENCOUNTER — HOSPITAL ENCOUNTER (OUTPATIENT)
Dept: RADIATION ONCOLOGY | Age: 62
Setting detail: RECURRING SERIES
Discharge: HOME | End: 2022-11-14
Payer: COMMERCIAL

## 2022-11-11 PROCEDURE — 77412 RADIATION TX DELIVERY LVL 3: CPT

## 2022-11-11 PROCEDURE — 77417 THER RADIOLOGY PORT IMAGE(S): CPT

## 2022-11-14 ENCOUNTER — HOSPITAL ENCOUNTER (OUTPATIENT)
Dept: RADIATION ONCOLOGY | Age: 62
Setting detail: RECURRING SERIES
Discharge: HOME | End: 2022-11-17
Payer: COMMERCIAL

## 2022-11-14 VITALS
SYSTOLIC BLOOD PRESSURE: 106 MMHG | OXYGEN SATURATION: 100 % | DIASTOLIC BLOOD PRESSURE: 79 MMHG | HEART RATE: 75 BPM | TEMPERATURE: 97.7 F

## 2022-11-14 PROCEDURE — 77412 RADIATION TX DELIVERY LVL 3: CPT

## 2022-11-14 NOTE — PROGRESS NOTES
RADIATION ONCOLOGY ON-TREATMENT VISIT  11/14/22    Diagnosis:  Cancer Staging  Malignant neoplasm of breast in female, estrogen receptor positive ([facility] [address]  Staging form: Breast, AJCC 8th Edition  - Clinical: cT1, cN0, cM0 - Signed by Jarrod Rodríguez MD on 9/1/2022      This is a 58 y.o. female who is currently receiving adjuvant radiation therapy to the R breast.    Current RT dose: 21.36/40.05 Gy in 8/15 fractions. No concurrent systemic therapy    Subjective:  She continues to do well. No skin changes or breast pain. Objective: There were no vitals filed for this visit. Pain 0/10    General: Alert and conversant, in NAD    Assessment:  Patient is tolerating radiation therapy well. Plan:  -Continue daily skin care. -Continue RT as planned. -Treatment images reviewed. -The patient has a documented plan of care to address pain. Pain is not present.       Celeste Lopez MD  11/14/22

## 2022-11-15 ENCOUNTER — HOSPITAL ENCOUNTER (OUTPATIENT)
Dept: RADIATION ONCOLOGY | Age: 62
Setting detail: RECURRING SERIES
Discharge: HOME | End: 2022-11-18
Payer: COMMERCIAL

## 2022-11-15 PROCEDURE — 77412 RADIATION TX DELIVERY LVL 3: CPT

## 2022-11-16 ENCOUNTER — HOSPITAL ENCOUNTER (OUTPATIENT)
Dept: RADIATION ONCOLOGY | Age: 62
Setting detail: RECURRING SERIES
Discharge: HOME | End: 2022-11-19
Payer: COMMERCIAL

## 2022-11-16 PROCEDURE — 77412 RADIATION TX DELIVERY LVL 3: CPT

## 2022-11-16 PROCEDURE — 77336 RADIATION PHYSICS CONSULT: CPT

## 2022-11-17 ENCOUNTER — HOSPITAL ENCOUNTER (OUTPATIENT)
Dept: RADIATION ONCOLOGY | Age: 62
Setting detail: RECURRING SERIES
Discharge: HOME | End: 2022-11-20
Payer: COMMERCIAL

## 2022-11-17 PROCEDURE — 77417 THER RADIOLOGY PORT IMAGE(S): CPT

## 2022-11-17 PROCEDURE — 77412 RADIATION TX DELIVERY LVL 3: CPT

## 2022-11-18 ENCOUNTER — HOSPITAL ENCOUNTER (OUTPATIENT)
Dept: RADIATION ONCOLOGY | Age: 62
Setting detail: RECURRING SERIES
Discharge: HOME | End: 2022-11-21
Payer: COMMERCIAL

## 2022-11-18 PROCEDURE — 77412 RADIATION TX DELIVERY LVL 3: CPT

## 2022-11-20 ENCOUNTER — APPOINTMENT (OUTPATIENT)
Dept: RADIATION ONCOLOGY | Age: 62
End: 2022-11-20
Payer: COMMERCIAL

## 2022-11-21 ENCOUNTER — HOSPITAL ENCOUNTER (OUTPATIENT)
Dept: RADIATION ONCOLOGY | Age: 62
Setting detail: RECURRING SERIES
Discharge: HOME | End: 2022-11-24
Payer: COMMERCIAL

## 2022-11-21 PROCEDURE — 77412 RADIATION TX DELIVERY LVL 3: CPT

## 2022-11-22 ENCOUNTER — HOSPITAL ENCOUNTER (OUTPATIENT)
Dept: RADIATION ONCOLOGY | Age: 62
Setting detail: RECURRING SERIES
Discharge: HOME | End: 2022-11-25
Payer: COMMERCIAL

## 2022-11-22 ENCOUNTER — APPOINTMENT (OUTPATIENT)
Dept: RADIATION ONCOLOGY | Age: 62
End: 2022-11-22
Payer: COMMERCIAL

## 2022-11-22 PROCEDURE — 77412 RADIATION TX DELIVERY LVL 3: CPT

## 2022-11-23 ENCOUNTER — HOSPITAL ENCOUNTER (OUTPATIENT)
Dept: RADIATION ONCOLOGY | Age: 62
Setting detail: RECURRING SERIES
Discharge: HOME | End: 2022-11-26
Payer: COMMERCIAL

## 2022-11-23 ENCOUNTER — APPOINTMENT (OUTPATIENT)
Dept: RADIATION ONCOLOGY | Age: 62
End: 2022-11-23
Payer: COMMERCIAL

## 2022-11-23 PROCEDURE — 77412 RADIATION TX DELIVERY LVL 3: CPT

## 2022-11-23 PROCEDURE — 77336 RADIATION PHYSICS CONSULT: CPT

## 2022-11-23 NOTE — PROGRESS NOTES
RADIATION ONCOLOGY ON-TREATMENT VISIT  11/23/22    Diagnosis:  Cancer Staging  Malignant neoplasm of breast in female, estrogen receptor positive ([facility] [address]  Staging form: Breast, AJCC 8th Edition  - Clinical: cT1, cN0, cM0 - Signed by Loni Mcburney, MD on 9/1/2022      This is a 58 y.o. female who is currently receiving adjuvant radiation therapy to the R breast.  I am seeing this patient today in Dr. Chin Sample absence. Current RT dose: 40.05/40.05 Gy in 15/15 fractions. No concurrent systemic therapy    Subjective:  She is doing well. Denies any skin changes or breast pain. Objective: There were no vitals filed for this visit. Pain 0/10    General: Alert and conversant, in NAD    Assessment:  Patient is tolerating radiation therapy well. Plan:  -Completed RT today. Offered 1 month follow-up here, she declined and prefers to follow-up here as needed. She will continue to follow-up with Dr. Elizabeth Gipson. Mammogram in 6 months. -Treatment images reviewed. -The patient has a documented plan of care to address pain. Pain is not present.       Daryn Van MD  11/23/22

## 2022-11-28 DIAGNOSIS — Z12.31 SCREENING MAMMOGRAM FOR HIGH-RISK PATIENT: ICD-10-CM

## 2022-11-28 DIAGNOSIS — C50.911 MALIGNANT NEOPLASM OF RIGHT FEMALE BREAST, UNSPECIFIED ESTROGEN RECEPTOR STATUS, UNSPECIFIED SITE OF BREAST (HCC): Primary | ICD-10-CM

## 2022-12-02 DIAGNOSIS — C50.911 MALIGNANT NEOPLASM OF RIGHT BREAST IN FEMALE, ESTROGEN RECEPTOR POSITIVE, UNSPECIFIED SITE OF BREAST (HCC): Primary | ICD-10-CM

## 2022-12-02 DIAGNOSIS — Z17.0 MALIGNANT NEOPLASM OF RIGHT BREAST IN FEMALE, ESTROGEN RECEPTOR POSITIVE, UNSPECIFIED SITE OF BREAST (HCC): Primary | ICD-10-CM

## 2022-12-02 ASSESSMENT — ENCOUNTER SYMPTOMS
ABDOMINAL PAIN: 0
SORE THROAT: 0
HEMOPTYSIS: 0
ABDOMINAL DISTENTION: 0
VOICE CHANGE: 0
DIARRHEA: 0
CONSTIPATION: 0
VOMITING: 0
NAUSEA: 0
CHEST TIGHTNESS: 0
WHEEZING: 0
SHORTNESS OF BREATH: 0
TROUBLE SWALLOWING: 0
BLOOD IN STOOL: 0
SCLERAL ICTERUS: 0

## 2022-12-02 NOTE — PROGRESS NOTES
[facility] Hematology and Oncology: Established patient - follow up     Chief Complaint   Patient presents with    Follow-up     Reason for Referral: Breast cancer  Referring Provider:  Tyra Mireles MD  Family History of Cancer/Hematologic Disorders: None noted   Presenting Symptoms: abnormal routine screening mammogram    History of Present Illness:  Ms. Micheline Warren is a 58 y.o. female who presents today for follow up regarding breast cancer. The past medical history is significant for anxiety, endocrine disorder, headache and menopause. Sxhx: hysterectomy and breast biopsy. In July 2022, Ms. Micheline Warren presented for her routine screening mammogram. The imaging reported a right breast mass. On 8/11/22 she had a right breast US that reported a 5 mm solid mass in the breast. On 8/16/22 she underwent a core needle biopsy of the mass. The pathology reported the right breast mass as infiltrating ductal carcinoma, low grade (well differentiated). Definite in situ component and lymphovascular invasion were not identified. Her IHC staining reported ER (97%) and OH (93%) as positive and HER-2 (+1) as negative. A referral was placed to medical oncology for next steps in the plan of care for her newly diagnosed right breast cancer. She has an appointment for surgical consult with Dr Harinder Simmons on 9/8/22. never used tobacco products. She denies use of alcohol or drug substances. At consultation, we discussed the pathophysiology of breast cancer, staging, and the importance of receptor status in terms of treatment options. We then reviewed her medical history as well as oncologic history, recent imaging and pathology in detail. We discussed next steps in management. After sx, we discussed her OncotypDx results that showed low risk of recurrent disease w chemotherapy benefit of <1%. Today, she is here for FU after radiation. Doing quite well. She is a candidate for endo therapy and we reviewed this in detail today.   KELECHI discussed and SE reviewed for letrozole. She wishes to start letrozole. No current s/s of infection. Venlafaxine working for her hot flashes with frequency of events down to 1/2 what she had prior. Wishes to hold off on endo until after x-mas. Labs reviewed. Chronological Events:   21 Screening Mammogram  Impression       NO SPECIFIC MAMMOGRAPHIC EVIDENCE FOR MALIGNANCY. FOLLOW UP BILATERAL SCREENING   MAMMOGRAPHY IS RECOMMENDED IN ONE YEAR.       22 Screening Mammogram  Impression   Right mass for which further evaluation is recommended with targeted   ultrasound. 22 Right Breast Ultrasound  Impression   5 mm solid mass in the 2:00 right breast at 6 cm from the nipple. An   ultrasound-guided biopsy is recommended. 2022 Surgical Pathology  DIAGNOSIS        A:  \" RIGHT BREAST, 2:00 POSITION, 6 CM FROM NIPPLE, CORE BIOPSY\":         INFILTRATING DUCTAL CARCINOMA, LOW GRADE (WELL DIFFERENTIATED).    DEFINITE IN SITU COMPONENT AND LYMPHOVASCULAR INVASION ARE NOT IDENTIFIED   Procedures/Addenda                     STF- ER/TN/QMC0VBM BY IHC                          Interpretation                       [facility] IHC Quantitative Breast Panel     TEST NAME:                                          RESULTS:               INTERNAL CONTROLS:     ESTROGEN RECEPTOR:                     Positive (97%)               Present, Positive   PROGESTERONE RECEPTOR:           Positive (93%)               Present, Positive   HER-2/RAMONA:                                           Negative (1+)               Percentage of Cells with Uniform                                                                                                               Intense Complete Membrane                                                                                                               Stainin%     22 heme/onc consultation   22 wire loc lumpectomy and right axill SLNbx   22 post op tumor board  10/17/22 FU after sx - discussed OncotypeDx results; rad onc next followed by endo therapy   XRT - Dr Fabian George   12/5/22 FU after XRT - start endo after Félix per pt's wishes       Family History   Problem Relation Age of Onset    Heart Disease Father     Heart Disease Brother     Breast Cancer Neg Hx       Social History     Socioeconomic History    Marital status:      Spouse name: None    Number of children: None    Years of education: None    Highest education level: None   Tobacco Use    Smoking status: Never    Smokeless tobacco: Never   Substance and Sexual Activity    Alcohol use: No    Drug use: No        Review of Systems   Constitutional:  Negative for appetite change, chills, diaphoresis, fatigue, fever and unexpected weight change. HENT:   Negative for hearing loss, mouth sores, nosebleeds, sore throat, trouble swallowing and voice change. Eyes:  Negative for icterus. Respiratory:  Negative for chest tightness, hemoptysis, shortness of breath and wheezing. Cardiovascular:  Negative for chest pain, leg swelling and palpitations. Gastrointestinal:  Negative for abdominal distention, abdominal pain, blood in stool, constipation, diarrhea, nausea and vomiting. Endocrine: Negative for hot flashes. Genitourinary:  Negative for difficulty urinating, frequency, vaginal bleeding and vaginal discharge. Musculoskeletal:  Negative for arthralgias, flank pain, gait problem and myalgias. Skin:  Negative for itching, rash and wound. Neurological:  Negative for dizziness, extremity weakness, gait problem, headaches and numbness. Psychiatric/Behavioral:  Positive for depression. Negative for confusion. The patient is not nervous/anxious.        No Known Allergies  Past Medical History:   Diagnosis Date    Anxiety     Breast cancer ([address]     right    Difficult intubation     Endocrine disorder     Headache     Menopause     PONV (postoperative nausea and vomiting)      Past Surgical History:   Procedure Laterality Date    BREAST BIOPSY Right 9/23/2022    WIRE LOCALIZED RIGHT BREAST PARTIAL MASTECTOMY  Pre-op : 6:30  Lympho:8:00  LOC:  9:15   Surgery:  11:04am performed by Acacia Burns MD at [facility] Right 9/23/2022    RIGHT BREAST SENTINEL LYMPH NODE BIOPSY performed by Acacia Burns MD at [facility] (CERVIX STATUS UNKNOWN)      FOR UTERINE PROLAPSE, DID NOT TAKE OVARIES    US BREAST NEEDLE BIOPSY RIGHT Right 08/16/2022    US BREAST NEEDLE BIOPSY RIGHT 8/16/2022 [facility]    US GUIDED NEEDLE LOC OF RIGHT BREAST Right 9/23/2022    US GUIDED NEEDLE LOC OF RIGHT BREAST 9/23/2022 Will Patrick MD [facility]     Current Outpatient Medications   Medication Sig Dispense Refill    letrozole (FEMARA) 2.5 MG tablet Take 1 tablet by mouth daily 90 tablet 0    citalopram (CELEXA) 40 MG tablet Take 40 mg by mouth daily      venlafaxine (EFFEXOR XR) 37.5 MG extended release capsule Take 1 capsule by mouth daily 30 capsule 1    Multiple Vitamin (MULTIVITAMIN ADULT PO) Take 1 tablet by mouth daily      rosuvastatin (CRESTOR) 10 MG tablet Take 10 mg by mouth daily      amitriptyline (ELAVIL) 25 MG tablet Take 12.5 mg by mouth nightly       No current facility-administered medications for this visit. No flowsheet data found. OBJECTIVE:  /80 (Site: Right Upper Arm, Position: Standing)   Pulse 82   Temp 98 °F (36.7 °C)   Resp 12   Ht 5' 4\" (1.626 m)   Wt 119 lb (54 kg)   SpO2 100%   BMI 20.43 kg/m²       ECOG PERFORMANCE STATUS - 0-Fully active, able to carry on all pre-disease performance without restriction. Pain - 0 - No pain/10. None/Minimal pain - not affecting QOL     Fatigue - No flowsheet data found. Distress - No flowsheet data found. Physical Exam  Vitals reviewed. Exam conducted with a chaperone present. Constitutional:       General: She is not in acute distress. Appearance: Normal appearance. She is normal weight. She is not ill-appearing or toxic-appearing. HENT:      Head: Normocephalic and atraumatic. Nose: Nose normal.      Mouth/Throat:      Mouth: Mucous membranes are moist.   Eyes:      General: No scleral icterus. Extraocular Movements: Extraocular movements intact. Conjunctiva/sclera: Conjunctivae normal.      Pupils: Pupils are equal, round, and reactive to light. Cardiovascular:      Rate and Rhythm: Normal rate and regular rhythm. Heart sounds: No murmur heard. Pulmonary:      Effort: Pulmonary effort is normal. No respiratory distress. Breath sounds: Normal breath sounds. No wheezing or rales. Chest:      Comments: Post xrt changes - helaed well   No axillary LAD   Abdominal:      General: There is no distension. Palpations: Abdomen is soft. Tenderness: There is no abdominal tenderness. Musculoskeletal:         General: Normal range of motion. Cervical back: Normal range of motion. Right lower leg: No edema. Left lower leg: No edema. Lymphadenopathy:      Cervical: No cervical adenopathy. Upper Body:      Right upper body: No supraclavicular or axillary adenopathy. Left upper body: No supraclavicular or axillary adenopathy. Skin:     General: Skin is warm and dry. Coloration: Skin is not jaundiced or pale. Findings: No rash. Neurological:      General: No focal deficit present. Mental Status: She is alert and oriented to person, place, and time. Gait: Gait normal.   Psychiatric:         Behavior: Behavior normal.         Thought Content:  Thought content normal.        Labs:  Recent Results (from the past 168 hour(s))   CBC with Auto Differential    Collection Time: 12/05/22  9:21 AM   Result Value Ref Range    WBC 3.4 (L) 4.3 - 11.1 K/uL    RBC 4.16 4.05 - 5.2 M/uL    Hemoglobin 12.8 11.7 - 15.4 g/dL    Hematocrit 39.3 35.8 - 46.3 %    MCV 94.5 82.0 - 102.0 FL    MCH 30.8 26.1 - 32.9 PG    MCHC 32.6 31.4 - 35.0 g/dL    RDW 12.8 11.9 - 14.6 %    Platelets 128 887 - 690 K/uL    MPV 9.4 9.4 - 12.3 FL    nRBC 0.00 0.0 - 0.2 K/uL    Seg Neutrophils 66 43 - 78 %    Lymphocytes 20 13 - 44 %    Monocytes 10 4.0 - 12.0 %    Eosinophils % 3 0.5 - 7.8 %    Basophils 1 0.0 - 2.0 %    Immature Granulocytes 0 0.0 - 5.0 %    Segs Absolute 2.3 1.7 - 8.2 K/UL    Absolute Lymph # 0.7 0.5 - 4.6 K/UL    Absolute Mono # 0.3 0.1 - 1.3 K/UL    Absolute Eos # 0.1 0.0 - 0.8 K/UL    Basophils Absolute 0.0 0.0 - 0.2 K/UL    Absolute Immature Granulocyte 0.0 0.0 - 0.5 K/UL    Differential Type AUTOMATED     Comprehensive Metabolic Panel    Collection Time: 12/05/22  9:21 AM   Result Value Ref Range    Sodium 140 133 - 143 mmol/L    Potassium 4.9 3.5 - 5.1 mmol/L    Chloride 108 101 - 110 mmol/L    CO2 29 21 - 32 mmol/L    Anion Gap 3 2 - 11 mmol/L    Glucose 88 65 - 100 mg/dL    BUN 16 8 - 23 MG/DL    Creatinine 0.80 0.6 - 1.0 MG/DL    Est, Glom Filt Rate >60 >60 ml/min/1.73m2    Calcium 9.1 8.3 - 10.4 MG/DL    Total Bilirubin 0.3 0.2 - 1.1 MG/DL    ALT 32 12 - 65 U/L    AST 24 15 - 37 U/L    Alk Phosphatase 51 50 - 136 U/L    Total Protein 6.7 6.3 - 8.2 g/dL    Albumin 3.8 3.2 - 4.6 g/dL    Globulin 2.9 2.8 - 4.5 g/dL    Albumin/Globulin Ratio 1.3 0.4 - 1.6         Imaging: reviewed     PATHOLOGY:              9/2022           10/2022       ASSESSMENT:     Diagnosis Orders   1. Malignant neoplasm of right breast in female, estrogen receptor positive, unspecified site of breast ([facility] [place] [address]  CBC with Auto Differential    Comprehensive Metabolic Panel    letrozole (FEMARA) 2.5 MG tablet      2. Aromatase inhibitor use  DEXA BONE DENSITY AXIAL SKELETON      3. Hot flashes              Ms. Claudy [place] is here for FU of breast cancer.       Right breast IDC, low grade, ER97/PR93 and Her2 +1, negative, s/p core bx - 7mm, no DCIS or LVI   - S/p lumpectomy - 3mm residual disease, no LN involvement   - s/p OncotypeDx (7mm on bx) - low score, benefit of chemo <1%, risk of distant recurrent disease 4% at 9 years on endo tx, score 16  - s/p XRT - Dr Emir Marinelli  completed end of Nov 2022   - start endo 1/2023    here for FU after radiation. Doing quite well. She is a candidate for endo therapy and we reviewed this in detail today. MOA discussed and SE reviewed for letrozole. She wishes to start letrozole. No current s/s of infection. - hot flashes - Venlafaxine working for her hot flashes with frequency of events down to 1/2 what she had prior.    - AIs can cause hot flashes, HTN, angina, swelling, fatigue, bone thinning leading to osteoporosis/fractures, joint stiffness, N/V, hair thinning, weight changes, thrombosis and worsening depression to name a few. To check BP before and after starting pill to monitor.    - hot flashes - will start venlafaxine 37/5 mg - she will keep track of nr per 24 before tx and after; on amitriptyline - interaction noted; pt educated re serotonin syndrome s/s by DEBRA. 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   - DEXA q 2years     RESUSCITATION DIRECTIVES/HOSPICE CARE: Full Support    RTC 2mo or sooner as needed     MDM  Number of Diagnoses or Management Options  Malignant neoplasm of right breast in female, estrogen receptor positive, unspecified site of breast ([facility] [address]): established, improving     Amount and/or Complexity of Data Reviewed  Clinical lab tests: ordered and reviewed  Tests in the radiology section of CPT®: ordered and reviewed  Tests in the medicine section of CPT®: ordered  Review and summarize past medical records: yes  Independent visualization of images, tracings, or specimens: yes    Risk of Complications, Morbidity, and/or Mortality  Presenting problems: moderate  Diagnostic procedures: moderate  Management options: moderate      Lab studies and imaging studies were personally reviewed. Pertinent old records were reviewed.      Historical:   - we discussed the pathophysiology of breast cancer, staging, and the importance of receptor status in terms of treatment options. We then reviewed her medical history as well as oncologic history, recent imaging and pathology in detail. We discussed next steps in management. She will p/w sx upfront  - role of OncotypeDx reviewed and it's prognostic/predictive value   - final staging will be determined at time of sx - she VU   - certainly a candidate for endo therapy - MOA and SE reviewed.    -advised to stop HRT, can try venlafaxine for hot flashes   - here for FU after surgery. We discussed her OncotypDx results that showed low risk of recurrent disease w chemotherapy benefit of <1%. We also reviewed next steps which will be radiation. She has an apt. - She is a candidate for endo therapy and we reviewed this in detail today. MOA discussed and SE reviewed in detail - for PEREZ and AIs. She was given printed info re both for her review too. - RUQ discomfort/under the ribs and wishes to p/w labs to eval LFts post sx. Can also try Voltaren gel to see if the sensation goes away. No other GI s/s. Does not drink alcohol. Perez can result in vision changes/cataracts, VTE, uterine cancer (pt s/p hysterectomy). All questions were asked and answered to the best of my ability. The patient verbalized understanding and agrees with the plan above.             [facility] Mortimer Tulio) Macy Healy, [address] [place] [address] Hematology and Oncology  [address] [place] [address]  Office : [phone]  Fax : [phone]

## 2022-12-05 ENCOUNTER — OFFICE VISIT (OUTPATIENT)
Dept: ONCOLOGY | Age: 62
End: 2022-12-05
Payer: COMMERCIAL

## 2022-12-05 ENCOUNTER — HOSPITAL ENCOUNTER (OUTPATIENT)
Dept: LAB | Age: 62
Discharge: HOME | End: 2022-12-08
Payer: COMMERCIAL

## 2022-12-05 VITALS
WEIGHT: 119 LBS | TEMPERATURE: 98 F | SYSTOLIC BLOOD PRESSURE: 119 MMHG | HEIGHT: 64 IN | RESPIRATION RATE: 12 BRPM | DIASTOLIC BLOOD PRESSURE: 80 MMHG | OXYGEN SATURATION: 100 % | HEART RATE: 82 BPM | BODY MASS INDEX: 20.32 KG/M2

## 2022-12-05 DIAGNOSIS — Z17.0 MALIGNANT NEOPLASM OF RIGHT BREAST IN FEMALE, ESTROGEN RECEPTOR POSITIVE, UNSPECIFIED SITE OF BREAST (HCC): ICD-10-CM

## 2022-12-05 DIAGNOSIS — Z79.811 AROMATASE INHIBITOR USE: ICD-10-CM

## 2022-12-05 DIAGNOSIS — Z17.0 MALIGNANT NEOPLASM OF RIGHT BREAST IN FEMALE, ESTROGEN RECEPTOR POSITIVE, UNSPECIFIED SITE OF BREAST (HCC): Primary | ICD-10-CM

## 2022-12-05 DIAGNOSIS — C50.911 MALIGNANT NEOPLASM OF RIGHT BREAST IN FEMALE, ESTROGEN RECEPTOR POSITIVE, UNSPECIFIED SITE OF BREAST (HCC): Primary | ICD-10-CM

## 2022-12-05 DIAGNOSIS — R23.2 HOT FLASHES: ICD-10-CM

## 2022-12-05 DIAGNOSIS — C50.911 MALIGNANT NEOPLASM OF RIGHT BREAST IN FEMALE, ESTROGEN RECEPTOR POSITIVE, UNSPECIFIED SITE OF BREAST (HCC): ICD-10-CM

## 2022-12-05 LAB
ALBUMIN SERPL-MCNC: 3.8 G/DL (ref 3.2–4.6)
ALBUMIN/GLOB SERPL: 1.3 {RATIO} (ref 0.4–1.6)
ALP SERPL-CCNC: 51 U/L (ref 50–136)
ALT SERPL-CCNC: 32 U/L (ref 12–65)
ANION GAP SERPL CALC-SCNC: 3 MMOL/L (ref 2–11)
AST SERPL-CCNC: 24 U/L (ref 15–37)
BASOPHILS # BLD: 0 K/UL (ref 0–0.2)
BASOPHILS NFR BLD: 1 % (ref 0–2)
BILIRUB SERPL-MCNC: 0.3 MG/DL (ref 0.2–1.1)
BUN SERPL-MCNC: 16 MG/DL (ref 8–23)
CALCIUM SERPL-MCNC: 9.1 MG/DL (ref 8.3–10.4)
CHLORIDE SERPL-SCNC: 108 MMOL/L (ref 101–110)
CO2 SERPL-SCNC: 29 MMOL/L (ref 21–32)
CREAT SERPL-MCNC: 0.8 MG/DL (ref 0.6–1)
DIFFERENTIAL METHOD BLD: ABNORMAL
EOSINOPHIL # BLD: 0.1 K/UL (ref 0–0.8)
EOSINOPHIL NFR BLD: 3 % (ref 0.5–7.8)
ERYTHROCYTE [DISTWIDTH] IN BLOOD BY AUTOMATED COUNT: 12.8 % (ref 11.9–14.6)
GLOBULIN SER CALC-MCNC: 2.9 G/DL (ref 2.8–4.5)
GLUCOSE SERPL-MCNC: 88 MG/DL (ref 65–100)
HCT VFR BLD AUTO: 39.3 % (ref 35.8–46.3)
HGB BLD-MCNC: 12.8 G/DL (ref 11.7–15.4)
IMM GRANULOCYTES # BLD AUTO: 0 K/UL (ref 0–0.5)
IMM GRANULOCYTES NFR BLD AUTO: 0 % (ref 0–5)
LYMPHOCYTES # BLD: 0.7 K/UL (ref 0.5–4.6)
LYMPHOCYTES NFR BLD: 20 % (ref 13–44)
MCH RBC QN AUTO: 30.8 PG (ref 26.1–32.9)
MCHC RBC AUTO-ENTMCNC: 32.6 G/DL (ref 31.4–35)
MCV RBC AUTO: 94.5 FL (ref 82–102)
MONOCYTES # BLD: 0.3 K/UL (ref 0.1–1.3)
MONOCYTES NFR BLD: 10 % (ref 4–12)
NEUTS SEG # BLD: 2.3 K/UL (ref 1.7–8.2)
NEUTS SEG NFR BLD: 66 % (ref 43–78)
NRBC # BLD: 0 K/UL (ref 0–0.2)
PLATELET # BLD AUTO: 211 K/UL (ref 150–450)
PMV BLD AUTO: 9.4 FL (ref 9.4–12.3)
POTASSIUM SERPL-SCNC: 4.9 MMOL/L (ref 3.5–5.1)
PROT SERPL-MCNC: 6.7 G/DL (ref 6.3–8.2)
RBC # BLD AUTO: 4.16 M/UL (ref 4.05–5.2)
SODIUM SERPL-SCNC: 140 MMOL/L (ref 133–143)
WBC # BLD AUTO: 3.4 K/UL (ref 4.3–11.1)

## 2022-12-05 PROCEDURE — 36415 COLL VENOUS BLD VENIPUNCTURE: CPT

## 2022-12-05 PROCEDURE — 80053 COMPREHEN METABOLIC PANEL: CPT

## 2022-12-05 PROCEDURE — 99214 OFFICE O/P EST MOD 30 MIN: CPT | Performed by: INTERNAL MEDICINE

## 2022-12-05 PROCEDURE — 85025 COMPLETE CBC W/AUTO DIFF WBC: CPT

## 2022-12-05 RX ORDER — LETROZOLE 2.5 MG/1
2.5 TABLET, FILM COATED ORAL DAILY
Qty: 90 TABLET | Refills: 0 | Status: SHIPPED | OUTPATIENT
Start: 2022-12-05

## 2022-12-05 ASSESSMENT — PATIENT HEALTH QUESTIONNAIRE - PHQ9
SUM OF ALL RESPONSES TO PHQ QUESTIONS 1-9: 0
2. FEELING DOWN, DEPRESSED OR HOPELESS: 0
SUM OF ALL RESPONSES TO PHQ QUESTIONS 1-9: 0

## 2022-12-05 NOTE — PATIENT INSTRUCTIONS
Patient Instructions from Today's Visit    Reason for Visit:  Follow up breast cancer. Diagnosis Information:  [email or web address]      Plan:  Completed radiation. Reviewed antihormonal therapy - letrozole. Can start the pill in a couple weeks to heal a little longer from radiation. Watch your blood pressure at home prior to starting the pill and for several days after you start to make sure it's not going up. Can call [phone] to schedule bone density scan. Follow Up:  About a month after starting the pill, with labs.       Recent Lab Results:  Hospital Outpatient Visit on 12/05/2022   Component Date Value Ref Range Status    WBC 12/05/2022 3.4 (A)  4.3 - 11.1 K/uL Final    RBC 12/05/2022 4.16  4.05 - 5.2 M/uL Final    Hemoglobin 12/05/2022 12.8  11.7 - 15.4 g/dL Final    Hematocrit 12/05/2022 39.3  35.8 - 46.3 % Final    MCV 12/05/2022 94.5  82.0 - 102.0 FL Final    MCH 12/05/2022 30.8  26.1 - 32.9 PG Final    MCHC 12/05/2022 32.6  31.4 - 35.0 g/dL Final    RDW 12/05/2022 12.8  11.9 - 14.6 % Final    Platelets 18/58/1301 211  150 - 450 K/uL Final    MPV 12/05/2022 9.4  9.4 - 12.3 FL Final    nRBC 12/05/2022 0.00  0.0 - 0.2 K/uL Final    **Note: Absolute NRBC parameter is now reported with Hemogram**    Seg Neutrophils 12/05/2022 66  43 - 78 % Final    Lymphocytes 12/05/2022 20  13 - 44 % Final    Monocytes 12/05/2022 10  4.0 - 12.0 % Final    Eosinophils % 12/05/2022 3  0.5 - 7.8 % Final    Basophils 12/05/2022 1  0.0 - 2.0 % Final    Immature Granulocytes 12/05/2022 0  0.0 - 5.0 % Final    Segs Absolute 12/05/2022 2.3  1.7 - 8.2 K/UL Final    Absolute Lymph # 12/05/2022 0.7  0.5 - 4.6 K/UL Final    Absolute Mono # 12/05/2022 0.3  0.1 - 1.3 K/UL Final    Absolute Eos # 12/05/2022 0.1  0.0 - 0.8 K/UL Final    Basophils Absolute 12/05/2022 0.0  0.0 - 0.2 K/UL Final    Absolute Immature Granulocyte 12/05/2022 0.0  0.0 - 0.5 K/ Final    Differential Type 12/05/2022 AUTOMATED    Final         Treatment Summary has been discussed and given to patient: discussed with patient and will be provided by nurse navigator. -------------------------------------------------------------------------------------------------------------------  Please call our office at [phone] if you have any  of the following symptoms:   Fever of 100.5 or greater  Chills  Shortness of breath  Swelling or pain in one leg    After office hours an answering service is available and will contact a provider for emergencies or if you are experiencing any of the above symptoms. Patient did express an interest in My Chart. My Chart log in information explained on the after visit summary printout at the . [facility] [address] desk.     Reymundo Funk RN

## 2022-12-14 ENCOUNTER — TELEPHONE (OUTPATIENT)
Dept: ONCOLOGY | Age: 62
End: 2022-12-14

## 2022-12-14 DIAGNOSIS — R23.2 HOT FLASHES: ICD-10-CM

## 2022-12-14 RX ORDER — VENLAFAXINE HYDROCHLORIDE 37.5 MG/1
37.5 CAPSULE, EXTENDED RELEASE ORAL DAILY
Qty: 90 CAPSULE | Refills: 2 | Status: SHIPPED | OUTPATIENT
Start: 2022-12-14

## 2022-12-14 NOTE — TELEPHONE ENCOUNTER
Pt called for a refill of her venlafaxine (EFFEXOR XR) 37.5 MG. She asked for a 90 day supply if possible. Pharmacy is [facility] in [address]

## 2022-12-28 ENCOUNTER — HOSPITAL ENCOUNTER (OUTPATIENT)
Dept: MAMMOGRAPHY | Age: 62
Discharge: HOME | End: 2022-12-31
Payer: COMMERCIAL

## 2022-12-28 DIAGNOSIS — Z79.811 AROMATASE INHIBITOR USE: ICD-10-CM

## 2022-12-28 PROCEDURE — 77080 DXA BONE DENSITY AXIAL: CPT

## 2023-02-06 ENCOUNTER — HOSPITAL ENCOUNTER (OUTPATIENT)
Dept: LAB | Age: 63
Discharge: HOME | End: 2023-02-09
Payer: COMMERCIAL

## 2023-02-06 ENCOUNTER — OFFICE VISIT (OUTPATIENT)
Dept: ONCOLOGY | Age: 63
End: 2023-02-06
Payer: COMMERCIAL

## 2023-02-06 VITALS
WEIGHT: 122 LBS | OXYGEN SATURATION: 99 % | SYSTOLIC BLOOD PRESSURE: 113 MMHG | TEMPERATURE: 98.1 F | BODY MASS INDEX: 20.83 KG/M2 | RESPIRATION RATE: 16 BRPM | DIASTOLIC BLOOD PRESSURE: 69 MMHG | HEART RATE: 86 BPM | HEIGHT: 64 IN

## 2023-02-06 DIAGNOSIS — Z79.811 AROMATASE INHIBITOR USE: ICD-10-CM

## 2023-02-06 DIAGNOSIS — C50.911 MALIGNANT NEOPLASM OF RIGHT BREAST IN FEMALE, ESTROGEN RECEPTOR POSITIVE, UNSPECIFIED SITE OF BREAST (HCC): Primary | ICD-10-CM

## 2023-02-06 DIAGNOSIS — C50.911 MALIGNANT NEOPLASM OF RIGHT BREAST IN FEMALE, ESTROGEN RECEPTOR POSITIVE, UNSPECIFIED SITE OF BREAST (HCC): ICD-10-CM

## 2023-02-06 DIAGNOSIS — Z17.0 MALIGNANT NEOPLASM OF RIGHT BREAST IN FEMALE, ESTROGEN RECEPTOR POSITIVE, UNSPECIFIED SITE OF BREAST (HCC): Primary | ICD-10-CM

## 2023-02-06 DIAGNOSIS — R23.2 HOT FLASHES: ICD-10-CM

## 2023-02-06 DIAGNOSIS — Z17.0 MALIGNANT NEOPLASM OF RIGHT BREAST IN FEMALE, ESTROGEN RECEPTOR POSITIVE, UNSPECIFIED SITE OF BREAST (HCC): ICD-10-CM

## 2023-02-06 LAB
ALBUMIN SERPL-MCNC: 3.7 G/DL (ref 3.2–4.6)
ALBUMIN/GLOB SERPL: 1.2 (ref 0.4–1.6)
ALP SERPL-CCNC: 57 U/L (ref 50–136)
ALT SERPL-CCNC: 21 U/L (ref 12–65)
ANION GAP SERPL CALC-SCNC: 3 MMOL/L (ref 2–11)
AST SERPL-CCNC: 16 U/L (ref 15–37)
BASOPHILS # BLD: 0 K/UL (ref 0–0.2)
BASOPHILS NFR BLD: 0 % (ref 0–2)
BILIRUB SERPL-MCNC: 0.2 MG/DL (ref 0.2–1.1)
BUN SERPL-MCNC: 16 MG/DL (ref 8–23)
CALCIUM SERPL-MCNC: 9.2 MG/DL (ref 8.3–10.4)
CHLORIDE SERPL-SCNC: 110 MMOL/L (ref 101–110)
CO2 SERPL-SCNC: 29 MMOL/L (ref 21–32)
CREAT SERPL-MCNC: 0.8 MG/DL (ref 0.6–1)
DIFFERENTIAL METHOD BLD: ABNORMAL
EOSINOPHIL # BLD: 0.2 K/UL (ref 0–0.8)
EOSINOPHIL NFR BLD: 4 % (ref 0.5–7.8)
ERYTHROCYTE [DISTWIDTH] IN BLOOD BY AUTOMATED COUNT: 12.7 % (ref 11.9–14.6)
GLOBULIN SER CALC-MCNC: 3.1 G/DL (ref 2.8–4.5)
GLUCOSE SERPL-MCNC: 86 MG/DL (ref 65–100)
HCT VFR BLD AUTO: 39.2 % (ref 35.8–46.3)
HGB BLD-MCNC: 12.6 G/DL (ref 11.7–15.4)
IMM GRANULOCYTES # BLD AUTO: 0 K/UL (ref 0–0.5)
IMM GRANULOCYTES NFR BLD AUTO: 0 % (ref 0–5)
LYMPHOCYTES # BLD: 0.6 K/UL (ref 0.5–4.6)
LYMPHOCYTES NFR BLD: 14 % (ref 13–44)
MCH RBC QN AUTO: 30.4 PG (ref 26.1–32.9)
MCHC RBC AUTO-ENTMCNC: 32.1 G/DL (ref 31.4–35)
MCV RBC AUTO: 94.5 FL (ref 82–102)
MONOCYTES # BLD: 0.4 K/UL (ref 0.1–1.3)
MONOCYTES NFR BLD: 8 % (ref 4–12)
NEUTS SEG # BLD: 3.5 K/UL (ref 1.7–8.2)
NEUTS SEG NFR BLD: 74 % (ref 43–78)
NRBC # BLD: 0 K/UL (ref 0–0.2)
PLATELET # BLD AUTO: 243 K/UL (ref 150–450)
PMV BLD AUTO: 9.3 FL (ref 9.4–12.3)
POTASSIUM SERPL-SCNC: 4.7 MMOL/L (ref 3.5–5.1)
PROT SERPL-MCNC: 6.8 G/DL (ref 6.3–8.2)
RBC # BLD AUTO: 4.15 M/UL (ref 4.05–5.2)
SODIUM SERPL-SCNC: 142 MMOL/L (ref 133–143)
WBC # BLD AUTO: 4.7 K/UL (ref 4.3–11.1)

## 2023-02-06 PROCEDURE — 99214 OFFICE O/P EST MOD 30 MIN: CPT | Performed by: NURSE PRACTITIONER

## 2023-02-06 PROCEDURE — 80053 COMPREHEN METABOLIC PANEL: CPT

## 2023-02-06 PROCEDURE — 36415 COLL VENOUS BLD VENIPUNCTURE: CPT

## 2023-02-06 PROCEDURE — 85025 COMPLETE CBC W/AUTO DIFF WBC: CPT

## 2023-02-06 RX ORDER — LETROZOLE 2.5 MG/1
2.5 TABLET, FILM COATED ORAL DAILY
Qty: 90 TABLET | Refills: 3 | Status: SHIPPED | OUTPATIENT
Start: 2023-02-06

## 2023-02-06 RX ORDER — VENLAFAXINE HYDROCHLORIDE 37.5 MG/1
37.5 CAPSULE, EXTENDED RELEASE ORAL DAILY
Qty: 90 CAPSULE | Refills: 3 | Status: SHIPPED | OUTPATIENT
Start: 2023-02-06

## 2023-02-06 ASSESSMENT — ENCOUNTER SYMPTOMS
VOMITING: 0
NAUSEA: 0
CONSTIPATION: 0
TROUBLE SWALLOWING: 0
SORE THROAT: 0
ABDOMINAL DISTENTION: 0
SCLERAL ICTERUS: 0
BLOOD IN STOOL: 0
HEMOPTYSIS: 0
DIARRHEA: 0
CHEST TIGHTNESS: 0
ABDOMINAL PAIN: 0
VOICE CHANGE: 0
SHORTNESS OF BREATH: 0
WHEEZING: 0

## 2023-02-06 ASSESSMENT — PATIENT HEALTH QUESTIONNAIRE - PHQ9
SUM OF ALL RESPONSES TO PHQ9 QUESTIONS 1 & 2: 0
SUM OF ALL RESPONSES TO PHQ QUESTIONS 1-9: 0
SUM OF ALL RESPONSES TO PHQ QUESTIONS 1-9: 0
2. FEELING DOWN, DEPRESSED OR HOPELESS: 0
SUM OF ALL RESPONSES TO PHQ QUESTIONS 1-9: 0
1. LITTLE INTEREST OR PLEASURE IN DOING THINGS: 0
SUM OF ALL RESPONSES TO PHQ QUESTIONS 1-9: 0

## 2023-02-06 NOTE — PROGRESS NOTES
[facility] Hematology and Oncology: Established patient - follow up     Chief Complaint   Patient presents with    Follow-up     Reason for Referral: Breast cancer  Referring Provider:  Juancarlos Bella MD  Family History of Cancer/Hematologic Disorders: None noted   Presenting Symptoms: abnormal routine screening mammogram    History of Present Illness:  Ms. Ministerio Mckenzie is a 58 y.o. female who presents today for follow up regarding breast cancer. The past medical history is significant for anxiety, endocrine disorder, headache and menopause. Sxhx: hysterectomy and breast biopsy. In July 2022, Ms. Ministerio Mckenzie presented for her routine screening mammogram. The imaging reported a right breast mass. On 8/11/22 she had a right breast US that reported a 5 mm solid mass in the breast. On 8/16/22 she underwent a core needle biopsy of the mass. The pathology reported the right breast mass as infiltrating ductal carcinoma, low grade (well differentiated). Definite in situ component and lymphovascular invasion were not identified. Her IHC staining reported ER (97%) and CA (93%) as positive and HER-2 (+1) as negative. A referral was placed to medical oncology for next steps in the plan of care for her newly diagnosed right breast cancer. She has an appointment for surgical consult with Dr Javier Quintero on 9/8/22. never used tobacco products. She denies use of alcohol or drug substances. At consultation, we discussed the pathophysiology of breast cancer, staging, and the importance of receptor status in terms of treatment options. We then reviewed her medical history as well as oncologic history, recent imaging and pathology in detail. We discussed next steps in management. After sx, we discussed her OncotypDx results that showed low risk of recurrent disease w chemotherapy benefit of <1%. She returns today for follow up on letrozole that she started in January. Overall, she is tolerating it very well.   There are no GI or bowel complaints. Appetite is good and weight is stable. Energy level is good. There is no cough, shortness of breath, or edema. Hot flashes are unchanged, tolerable with Effexor. She denies any pain or arthralgias. There are no breast changes or concerns. Chronological Events:   21 Screening Mammogram  Impression       NO SPECIFIC MAMMOGRAPHIC EVIDENCE FOR MALIGNANCY. FOLLOW UP BILATERAL SCREENING   MAMMOGRAPHY IS RECOMMENDED IN ONE YEAR.       22 Screening Mammogram  Impression   Right mass for which further evaluation is recommended with targeted   ultrasound. 22 Right Breast Ultrasound  Impression   5 mm solid mass in the 2:00 right breast at 6 cm from the nipple. An   ultrasound-guided biopsy is recommended. 2022 Surgical Pathology  DIAGNOSIS        A:  \" RIGHT BREAST, 2:00 POSITION, 6 CM FROM NIPPLE, CORE BIOPSY\":         INFILTRATING DUCTAL CARCINOMA, LOW GRADE (WELL DIFFERENTIATED).    DEFINITE IN SITU COMPONENT AND LYMPHOVASCULAR INVASION ARE NOT IDENTIFIED   Procedures/Addenda                     STF- ER/AR/UXL1TWR BY IHC                          Interpretation                       [employer] IHC Quantitative Breast Panel     TEST NAME:                                          RESULTS:               INTERNAL CONTROLS:     ESTROGEN RECEPTOR:                     Positive (97%)               Present, Positive   PROGESTERONE RECEPTOR:           Positive (93%)               Present, Positive   HER-2/RAMONA:                                           Negative (1+)               Percentage of Cells with Uniform                                                                                                               Intense Complete Membrane                                                                                                               Stainin%     22 heme/onc consultation   22 wire loc lumpectomy and right axill SLNbx   22 post op tumor board  10/17/22 FU after sx - discussed OncotypeDx results; rad onc next followed by endo therapy   XRT - Dr Deneen Escoto   12/5/22 FU after XRT - start endo after [place] per pt's wishes   2/6/23 FU on letrozole, doing well      Family History   Problem Relation Age of Onset    Heart Disease Father     Heart Disease Brother     Breast Cancer Neg Hx       Social History     Socioeconomic History    Marital status:      Spouse name: None    Number of children: None    Years of education: None    Highest education level: None   Tobacco Use    Smoking status: Never    Smokeless tobacco: Never   Substance and Sexual Activity    Alcohol use: No    Drug use: No        Review of Systems   Constitutional:  Negative for appetite change, chills, diaphoresis, fatigue, fever and unexpected weight change. HENT:   Negative for hearing loss, mouth sores, nosebleeds, sore throat, trouble swallowing and voice change. Eyes:  Negative for icterus. Respiratory:  Negative for chest tightness, hemoptysis, shortness of breath and wheezing. Cardiovascular:  Negative for chest pain, leg swelling and palpitations. Gastrointestinal:  Negative for abdominal distention, abdominal pain, blood in stool, constipation, diarrhea, nausea and vomiting. Endocrine: Positive for hot flashes. Genitourinary:  Negative for difficulty urinating, frequency, vaginal bleeding and vaginal discharge. Musculoskeletal:  Negative for arthralgias, flank pain, gait problem and myalgias. Skin:  Negative for itching, rash and wound. Neurological:  Negative for dizziness, extremity weakness, gait problem, headaches and numbness. Psychiatric/Behavioral:  Positive for depression. Negative for confusion. The patient is not nervous/anxious.        No Known Allergies  Past Medical History:   Diagnosis Date    Anxiety     Breast cancer ([facility] [address]     right    Difficult intubation     Endocrine disorder     Headache     Menopause     PONV (postoperative nausea and vomiting)      Past Surgical History:   Procedure Laterality Date    BREAST BIOPSY Right 9/23/2022    WIRE LOCALIZED RIGHT BREAST PARTIAL MASTECTOMY  Pre-op : 6:30  Lympho:8:00  LOC:  9:15   Surgery:  11:04am performed by Tracy Cueto MD at [facility] Right 9/23/2022    RIGHT BREAST SENTINEL LYMPH NODE BIOPSY performed by Tracy Cueto MD at [address] [place] [address] (CERVIX STATUS UNKNOWN)      FOR UTERINE PROLAPSE, DID NOT TAKE OVARIES    US BREAST BIOPSY W LOC DEVICE 1ST LESION RIGHT Right 08/16/2022    US BREAST NEEDLE BIOPSY RIGHT 8/16/2022 [facility]    US GUIDED NEEDLE LOC OF RIGHT BREAST Right 9/23/2022    US GUIDED NEEDLE LOC OF RIGHT BREAST 9/23/2022 Monse Phoenix MD [facility]     Current Outpatient Medications   Medication Sig Dispense Refill    venlafaxine (EFFEXOR XR) 37.5 MG extended release capsule Take 1 capsule by mouth daily 90 capsule 2    letrozole (FEMARA) 2.5 MG tablet Take 1 tablet by mouth daily 90 tablet 0    citalopram (CELEXA) 40 MG tablet Take 40 mg by mouth daily      Multiple Vitamin (MULTIVITAMIN ADULT PO) Take 1 tablet by mouth daily      rosuvastatin (CRESTOR) 10 MG tablet Take 10 mg by mouth daily      amitriptyline (ELAVIL) 25 MG tablet Take 12.5 mg by mouth nightly       No current facility-administered medications for this visit. No flowsheet data found. OBJECTIVE:  /69 (Site: Left Upper Arm, Position: Sitting, Cuff Size: Large Adult)   Pulse 86   Temp 98.1 °F (36.7 °C) (Oral)   Resp 16   Ht 5' 4\" (1.626 m)   Wt 122 lb (55.3 kg)   SpO2 99%   BMI 20.94 kg/m²       ECOG PERFORMANCE STATUS - 0-Fully active, able to carry on all pre-disease performance without restriction. Pain - 0 - No pain/10. None/Minimal pain - not affecting QOL     Fatigue - No flowsheet data found. Distress - No flowsheet data found. Physical Exam  Vitals reviewed.    Constitutional: General: She is not in acute distress. Appearance: Normal appearance. She is normal weight. She is not ill-appearing or toxic-appearing. HENT:      Head: Normocephalic and atraumatic. Nose: Nose normal.      Mouth/Throat:      Mouth: Mucous membranes are moist.   Eyes:      General: No scleral icterus. Conjunctiva/sclera: Conjunctivae normal.   Cardiovascular:      Rate and Rhythm: Normal rate and regular rhythm. Heart sounds: No murmur heard. Pulmonary:      Effort: Pulmonary effort is normal. No respiratory distress. Breath sounds: Normal breath sounds. No wheezing or rales. Abdominal:      General: Bowel sounds are normal. There is no distension. Palpations: Abdomen is soft. Tenderness: There is no abdominal tenderness. Musculoskeletal:         General: Normal range of motion. Cervical back: Normal range of motion. Right lower leg: No edema. Left lower leg: No edema. Lymphadenopathy:      Cervical: No cervical adenopathy. Upper Body:      Right upper body: No supraclavicular or axillary adenopathy. Left upper body: No supraclavicular or axillary adenopathy. Skin:     General: Skin is warm and dry. Coloration: Skin is not jaundiced or pale. Findings: No rash. Neurological:      General: No focal deficit present. Mental Status: She is alert and oriented to person, place, and time. Gait: Gait normal.   Psychiatric:         Behavior: Behavior normal.         Thought Content:  Thought content normal.        Labs:  Recent Results (from the past 168 hour(s))   CBC with Auto Differential    Collection Time: 02/06/23  9:16 AM   Result Value Ref Range    WBC 4.7 4.3 - 11.1 K/uL    RBC 4.15 4.05 - 5.2 M/uL    Hemoglobin 12.6 11.7 - 15.4 g/dL    Hematocrit 39.2 35.8 - 46.3 %    MCV 94.5 82.0 - 102.0 FL    MCH 30.4 26.1 - 32.9 PG    MCHC 32.1 31.4 - 35.0 g/dL    RDW 12.7 11.9 - 14.6 %    Platelets 950 016 - 568 K/uL    MPV 9.3 (L) 9.4 - 12.3 FL    nRBC 0.00 0.0 - 0.2 K/uL    Seg Neutrophils 74 43 - 78 %    Lymphocytes 14 13 - 44 %    Monocytes 8 4.0 - 12.0 %    Eosinophils % 4 0.5 - 7.8 %    Basophils 0 0.0 - 2.0 %    Immature Granulocytes 0 0.0 - 5.0 %    Segs Absolute 3.5 1.7 - 8.2 K/UL    Absolute Lymph # 0.6 0.5 - 4.6 K/UL    Absolute Mono # 0.4 0.1 - 1.3 K/UL    Absolute Eos # 0.2 0.0 - 0.8 K/UL    Basophils Absolute 0.0 0.0 - 0.2 K/UL    Absolute Immature Granulocyte 0.0 0.0 - 0.5 K/UL    Differential Type AUTOMATED     Comprehensive Metabolic Panel    Collection Time: 02/06/23  9:16 AM   Result Value Ref Range    Sodium 142 133 - 143 mmol/L    Potassium 4.7 3.5 - 5.1 mmol/L    Chloride 110 101 - 110 mmol/L    CO2 29 21 - 32 mmol/L    Anion Gap 3 2 - 11 mmol/L    Glucose 86 65 - 100 mg/dL    BUN 16 8 - 23 MG/DL    Creatinine 0.80 0.6 - 1.0 MG/DL    Est, Glom Filt Rate >60 >60 ml/min/1.73m2    Calcium 9.2 8.3 - 10.4 MG/DL    Total Bilirubin 0.2 0.2 - 1.1 MG/DL    ALT 21 12 - 65 U/L    AST 16 15 - 37 U/L    Alk Phosphatase 57 50 - 136 U/L    Total Protein 6.8 6.3 - 8.2 g/dL    Albumin 3.7 3.2 - 4.6 g/dL    Globulin 3.1 2.8 - 4.5 g/dL    Albumin/Globulin Ratio 1.2 0.4 - 1.6         Imaging: reviewed     PATHOLOGY:              9/2022           10/2022       ASSESSMENT:     Diagnosis Orders   1. Malignant neoplasm of right breast in female, estrogen receptor positive, unspecified site of breast ([address]        2. Aromatase inhibitor use        3. Hot flashes              Ms. Dez Devine is here for FU of breast cancer. Right breast IDC, low grade, ER97/PR93 and Her2 +1, negative, s/p core bx - 7mm, no DCIS or LVI   - S/p lumpectomy - 3mm residual disease, no LN involvement   - s/p OncotypeDx (7mm on bx) - low score, benefit of chemo <1%, risk of distant recurrent disease 4% at 9 years on endo tx, score 16  - s/p XRT - Dr Green Failing  completed end of Nov 2022   - start endo 1/2023    -here for FU on letrozole, started 01/2023 and tolerating well. - hot flashes - Venlafaxine working for her hot flashes   -repeat mammogram due in late May   - DEXA q 2years, 12/22 osteopenia, now taking daily Ca+/vit D supplement      RESUSCITATION DIRECTIVES/HOSPICE CARE: Full Support    RTC 6 mo per pt request or sooner as needed     MDM    Lab studies and imaging studies were personally reviewed. Pertinent old records were reviewed. Historical:   - we discussed the pathophysiology of breast cancer, staging, and the importance of receptor status in terms of treatment options. We then reviewed her medical history as well as oncologic history, recent imaging and pathology in detail. We discussed next steps in management. She will p/w sx upfront  - role of OncotypeDx reviewed and it's prognostic/predictive value   - final staging will be determined at time of sx - she VU   - certainly a candidate for endo therapy - MOA and SE reviewed.    -advised to stop HRT, can try venlafaxine for hot flashes   - here for FU after surgery. We discussed her OncotypDx results that showed low risk of recurrent disease w chemotherapy benefit of <1%. We also reviewed next steps which will be radiation. She has an apt. - She is a candidate for endo therapy and we reviewed this in detail today. MOA discussed and SE reviewed in detail - for PEREZ and AIs. She was given printed info re both for her review too. - RUQ discomfort/under the ribs and wishes to p/w labs to eval LFts post sx. Can also try Voltaren gel to see if the sensation goes away. No other GI s/s. Does not drink alcohol. Perez can result in vision changes/cataracts, VTE, uterine cancer (pt s/p hysterectomy). All questions were asked and answered to the best of my ability. The patient verbalized understanding and agrees with the plan above.             Rachid Alicea) [address] [place] [address], [facility] Hematology and Oncology  [address]  Office : [phone]  Fax : [phone]

## 2023-08-01 DIAGNOSIS — Z17.0 MALIGNANT NEOPLASM OF RIGHT BREAST IN FEMALE, ESTROGEN RECEPTOR POSITIVE, UNSPECIFIED SITE OF BREAST (HCC): Primary | ICD-10-CM

## 2023-08-01 DIAGNOSIS — C50.911 MALIGNANT NEOPLASM OF RIGHT BREAST IN FEMALE, ESTROGEN RECEPTOR POSITIVE, UNSPECIFIED SITE OF BREAST (HCC): Primary | ICD-10-CM

## 2023-08-01 ASSESSMENT — ENCOUNTER SYMPTOMS
SHORTNESS OF BREATH: 0
SORE THROAT: 0
BLOOD IN STOOL: 0
WHEEZING: 0
TROUBLE SWALLOWING: 0
VOICE CHANGE: 0
DIARRHEA: 0
HEMOPTYSIS: 0
CONSTIPATION: 0
ABDOMINAL PAIN: 0
VOMITING: 0
CHEST TIGHTNESS: 0
SCLERAL ICTERUS: 0
NAUSEA: 0
ABDOMINAL DISTENTION: 0

## 2023-08-02 ENCOUNTER — HOSPITAL ENCOUNTER (OUTPATIENT)
Dept: MAMMOGRAPHY | Age: 63
Discharge: HOME | End: 2023-08-05
Payer: COMMERCIAL

## 2023-08-02 DIAGNOSIS — C50.911 MALIGNANT NEOPLASM OF RIGHT FEMALE BREAST, UNSPECIFIED ESTROGEN RECEPTOR STATUS, UNSPECIFIED SITE OF BREAST (HCC): ICD-10-CM

## 2023-08-02 DIAGNOSIS — Z12.31 SCREENING MAMMOGRAM FOR HIGH-RISK PATIENT: ICD-10-CM

## 2023-08-02 PROCEDURE — 77063 BREAST TOMOSYNTHESIS BI: CPT

## 2023-08-07 ENCOUNTER — OFFICE VISIT (OUTPATIENT)
Dept: ONCOLOGY | Age: 63
End: 2023-08-07
Payer: COMMERCIAL

## 2023-08-07 ENCOUNTER — HOSPITAL ENCOUNTER (OUTPATIENT)
Dept: LAB | Age: 63
Discharge: HOME | End: 2023-08-10
Payer: COMMERCIAL

## 2023-08-07 VITALS
HEART RATE: 82 BPM | HEIGHT: 64 IN | TEMPERATURE: 98.7 F | RESPIRATION RATE: 16 BRPM | SYSTOLIC BLOOD PRESSURE: 111 MMHG | OXYGEN SATURATION: 99 % | WEIGHT: 120.9 LBS | DIASTOLIC BLOOD PRESSURE: 67 MMHG | BODY MASS INDEX: 20.64 KG/M2

## 2023-08-07 DIAGNOSIS — Z17.0 MALIGNANT NEOPLASM OF RIGHT BREAST IN FEMALE, ESTROGEN RECEPTOR POSITIVE, UNSPECIFIED SITE OF BREAST (HCC): Primary | ICD-10-CM

## 2023-08-07 DIAGNOSIS — Z17.0 MALIGNANT NEOPLASM OF RIGHT BREAST IN FEMALE, ESTROGEN RECEPTOR POSITIVE, UNSPECIFIED SITE OF BREAST (HCC): ICD-10-CM

## 2023-08-07 DIAGNOSIS — C50.911 MALIGNANT NEOPLASM OF RIGHT BREAST IN FEMALE, ESTROGEN RECEPTOR POSITIVE, UNSPECIFIED SITE OF BREAST (HCC): Primary | ICD-10-CM

## 2023-08-07 DIAGNOSIS — R23.2 HOT FLASHES: ICD-10-CM

## 2023-08-07 DIAGNOSIS — Z79.811 AROMATASE INHIBITOR USE: ICD-10-CM

## 2023-08-07 DIAGNOSIS — C50.911 MALIGNANT NEOPLASM OF RIGHT BREAST IN FEMALE, ESTROGEN RECEPTOR POSITIVE, UNSPECIFIED SITE OF BREAST (HCC): ICD-10-CM

## 2023-08-07 LAB
ALBUMIN SERPL-MCNC: 3.6 G/DL (ref 3.2–4.6)
ALBUMIN/GLOB SERPL: 1.2 (ref 0.4–1.6)
ALP SERPL-CCNC: 57 U/L (ref 50–136)
ALT SERPL-CCNC: 24 U/L (ref 12–65)
ANION GAP SERPL CALC-SCNC: 6 MMOL/L (ref 2–11)
AST SERPL-CCNC: 20 U/L (ref 15–37)
BASOPHILS # BLD: 0 K/UL (ref 0–0.2)
BASOPHILS NFR BLD: 1 % (ref 0–2)
BILIRUB SERPL-MCNC: 0.2 MG/DL (ref 0.2–1.1)
BUN SERPL-MCNC: 15 MG/DL (ref 8–23)
CALCIUM SERPL-MCNC: 9.1 MG/DL (ref 8.3–10.4)
CHLORIDE SERPL-SCNC: 110 MMOL/L (ref 101–110)
CO2 SERPL-SCNC: 27 MMOL/L (ref 21–32)
CREAT SERPL-MCNC: 0.7 MG/DL (ref 0.6–1)
DIFFERENTIAL METHOD BLD: ABNORMAL
EOSINOPHIL # BLD: 0.2 K/UL (ref 0–0.8)
EOSINOPHIL NFR BLD: 4 % (ref 0.5–7.8)
ERYTHROCYTE [DISTWIDTH] IN BLOOD BY AUTOMATED COUNT: 13.1 % (ref 11.9–14.6)
GLOBULIN SER CALC-MCNC: 3.1 G/DL (ref 2.8–4.5)
GLUCOSE SERPL-MCNC: 90 MG/DL (ref 65–100)
HCT VFR BLD AUTO: 39.1 % (ref 35.8–46.3)
HGB BLD-MCNC: 12.5 G/DL (ref 11.7–15.4)
IMM GRANULOCYTES # BLD AUTO: 0 K/UL (ref 0–0.5)
IMM GRANULOCYTES NFR BLD AUTO: 0 % (ref 0–5)
LYMPHOCYTES # BLD: 0.8 K/UL (ref 0.5–4.6)
LYMPHOCYTES NFR BLD: 22 % (ref 13–44)
MCH RBC QN AUTO: 30.1 PG (ref 26.1–32.9)
MCHC RBC AUTO-ENTMCNC: 32 G/DL (ref 31.4–35)
MCV RBC AUTO: 94.2 FL (ref 82–102)
MONOCYTES # BLD: 0.3 K/UL (ref 0.1–1.3)
MONOCYTES NFR BLD: 7 % (ref 4–12)
NEUTS SEG # BLD: 2.6 K/UL (ref 1.7–8.2)
NEUTS SEG NFR BLD: 66 % (ref 43–78)
NRBC # BLD: 0 K/UL (ref 0–0.2)
PLATELET # BLD AUTO: 238 K/UL (ref 150–450)
PMV BLD AUTO: 9.4 FL (ref 9.4–12.3)
POTASSIUM SERPL-SCNC: 4.2 MMOL/L (ref 3.5–5.1)
PROT SERPL-MCNC: 6.7 G/DL (ref 6.3–8.2)
RBC # BLD AUTO: 4.15 M/UL (ref 4.05–5.2)
SODIUM SERPL-SCNC: 143 MMOL/L (ref 133–143)
WBC # BLD AUTO: 3.9 K/UL (ref 4.3–11.1)

## 2023-08-07 PROCEDURE — 99214 OFFICE O/P EST MOD 30 MIN: CPT | Performed by: INTERNAL MEDICINE

## 2023-08-07 PROCEDURE — 80053 COMPREHEN METABOLIC PANEL: CPT

## 2023-08-07 PROCEDURE — 36415 COLL VENOUS BLD VENIPUNCTURE: CPT

## 2023-08-07 PROCEDURE — 85025 COMPLETE CBC W/AUTO DIFF WBC: CPT

## 2023-08-07 ASSESSMENT — PATIENT HEALTH QUESTIONNAIRE - PHQ9
SUM OF ALL RESPONSES TO PHQ QUESTIONS 1-9: 0
1. LITTLE INTEREST OR PLEASURE IN DOING THINGS: 0
SUM OF ALL RESPONSES TO PHQ QUESTIONS 1-9: 0
2. FEELING DOWN, DEPRESSED OR HOPELESS: 0
SUM OF ALL RESPONSES TO PHQ9 QUESTIONS 1 & 2: 0

## 2023-08-07 NOTE — PATIENT INSTRUCTIONS
0.0  0.0 - 0.2 K/UL Final    Absolute Immature Granulocyte 08/07/2023 0.0  0.0 - 0.5 K/UL Final    Sodium 08/07/2023 143  133 - 143 mmol/L Final    Potassium 08/07/2023 4.2  3.5 - 5.1 mmol/L Final    Chloride 08/07/2023 110  101 - 110 mmol/L Final    CO2 08/07/2023 27  21 - 32 mmol/L Final    Anion Gap 08/07/2023 6  2 - 11 mmol/L Final    Glucose 08/07/2023 90  65 - 100 mg/dL Final    BUN 08/07/2023 15  8 - 23 MG/DL Final    Creatinine 08/07/2023 0.70  0.6 - 1.0 MG/DL Final    Est, Glom Filt Rate 08/07/2023 >60  >60 ml/min/1.73m2 Final    Comment:    Pediatric calculator link: [employer].at. org/professionals/kdoqi/gfr_calculatorped     These results are not intended for use in patients <25years of age. eGFR results are calculated without a race factor using  the 2021 CKD-EPI equation. Careful clinical correlation is recommended, particularly when comparing to results calculated using previous equations. The CKD-EPI equation is less accurate in patients with extremes of muscle mass, extra-renal metabolism of creatinine, excessive creatine ingestion, or following therapy that affects renal tubular secretion.       Calcium 08/07/2023 9.1  8.3 - 10.4 MG/DL Final    Total Bilirubin 08/07/2023 0.2  0.2 - 1.1 MG/DL Final    ALT 08/07/2023 24  12 - 65 U/L Final    AST 08/07/2023 20  15 - 37 U/L Final    Alk Phosphatase 08/07/2023 57  50 - 136 U/L Final    Total Protein 08/07/2023 6.7  6.3 - 8.2 g/dL Final    Albumin 08/07/2023 3.6  3.2 - 4.6 g/dL Final    Globulin 08/07/2023 3.1  2.8 - 4.5 g/dL Final    Albumin/Globulin Ratio 08/07/2023 1.2  0.4 - 1.6   Final         Treatment Summary has been discussed and given to patient: n/a        -------------------------------------------------------------------------------------------------------------------  Please call our office at [phone] if you have any  of the following symptoms:   Fever of 100.5 or greater  Chills  Shortness of breath  Swelling or pain in one

## 2023-08-15 ENCOUNTER — CLINICAL DOCUMENTATION (OUTPATIENT)
Dept: ONCOLOGY | Age: 63
End: 2023-08-15

## 2023-08-16 NOTE — PROGRESS NOTES
Received a fax from [address] [place] [address] at [address] [place] [address] Court, [place], [facility],[address] (phone #[phone]) stating that a prior authorization is required for the Veozah 45mg tablets. Prior authorization has been initiated in [facility] under [address] Oncology Office Visit Note from 8.7.23 and copy of the Rx were uploaded to the portal for medical review. Clinical questions were answered in the portal and sent to [employer] for review. Pending PA Case ID: [id number].

## 2023-08-17 ENCOUNTER — CLINICAL DOCUMENTATION (OUTPATIENT)
Dept: ONCOLOGY | Age: 63
End: 2023-08-17

## 2023-08-18 NOTE — PROGRESS NOTES
Prior authorization approval received for Veozah 45mg tablets. Authorization #[id number]  Validity Dates: 8/15/23 - 8/16/24    Call placed to [address] [place] [address] at #[phone].   Message left on pharmacy voicemail notifying of the approval.

## 2023-08-31 ENCOUNTER — APPOINTMENT (RX ONLY)
Dept: URBAN - METROPOLITAN AREA CLINIC 23 | Facility: CLINIC | Age: 63
Setting detail: DERMATOLOGY
End: 2023-08-31

## 2023-08-31 DIAGNOSIS — L82.1 OTHER SEBORRHEIC KERATOSIS: ICD-10-CM

## 2023-08-31 DIAGNOSIS — L81.4 OTHER MELANIN HYPERPIGMENTATION: ICD-10-CM

## 2023-08-31 DIAGNOSIS — L81.0 POSTINFLAMMATORY HYPERPIGMENTATION: ICD-10-CM

## 2023-08-31 DIAGNOSIS — L57.0 ACTINIC KERATOSIS: ICD-10-CM

## 2023-08-31 DIAGNOSIS — Z71.89 OTHER SPECIFIED COUNSELING: ICD-10-CM

## 2023-08-31 DIAGNOSIS — D18.0 HEMANGIOMA: ICD-10-CM

## 2023-08-31 DIAGNOSIS — L81.5 LEUKODERMA, NOT ELSEWHERE CLASSIFIED: ICD-10-CM

## 2023-08-31 DIAGNOSIS — D22 MELANOCYTIC NEVI: ICD-10-CM

## 2023-08-31 PROBLEM — D18.01 HEMANGIOMA OF SKIN AND SUBCUTANEOUS TISSUE: Status: ACTIVE | Noted: 2023-08-31

## 2023-08-31 PROBLEM — D22.39 MELANOCYTIC NEVI OF OTHER PARTS OF FACE: Status: ACTIVE | Noted: 2023-08-31

## 2023-08-31 PROCEDURE — 17000 DESTRUCT PREMALG LESION: CPT

## 2023-08-31 PROCEDURE — ? COUNSELING

## 2023-08-31 PROCEDURE — 99213 OFFICE O/P EST LOW 20 MIN: CPT | Mod: 25

## 2023-08-31 PROCEDURE — ? LIQUID NITROGEN

## 2023-08-31 ASSESSMENT — LOCATION ZONE DERM
LOCATION ZONE: SCALP
LOCATION ZONE: TRUNK
LOCATION ZONE: LEG
LOCATION ZONE: NOSE
LOCATION ZONE: ARM
LOCATION ZONE: NECK
LOCATION ZONE: FACE

## 2023-08-31 ASSESSMENT — LOCATION DETAILED DESCRIPTION DERM
LOCATION DETAILED: LEFT PROXIMAL DORSAL FOREARM
LOCATION DETAILED: RIGHT SUPERIOR NASAL CHEEK
LOCATION DETAILED: LEFT CENTRAL PARIETAL SCALP
LOCATION DETAILED: SUBXIPHOID
LOCATION DETAILED: LEFT DISTAL DORSAL FOREARM
LOCATION DETAILED: LEFT MEDIAL MALAR CHEEK
LOCATION DETAILED: RIGHT DISTAL PRETIBIAL REGION
LOCATION DETAILED: RIGHT POSTERIOR SHOULDER
LOCATION DETAILED: RIGHT RIB CAGE
LOCATION DETAILED: LEFT PROXIMAL PRETIBIAL REGION
LOCATION DETAILED: RIGHT PROXIMAL DORSAL FOREARM
LOCATION DETAILED: RIGHT PROXIMAL PRETIBIAL REGION
LOCATION DETAILED: RIGHT SUPERIOR LATERAL NECK
LOCATION DETAILED: LEFT POSTERIOR SHOULDER
LOCATION DETAILED: RIGHT DISTAL DORSAL FOREARM
LOCATION DETAILED: RIGHT CENTRAL FRONTAL SCALP
LOCATION DETAILED: NASAL SUPRATIP

## 2023-08-31 ASSESSMENT — LOCATION SIMPLE DESCRIPTION DERM
LOCATION SIMPLE: RIGHT PRETIBIAL REGION
LOCATION SIMPLE: RIGHT CHEEK
LOCATION SIMPLE: LEFT PRETIBIAL REGION
LOCATION SIMPLE: RIGHT FOREARM
LOCATION SIMPLE: LEFT CHEEK
LOCATION SIMPLE: NOSE
LOCATION SIMPLE: LEFT SHOULDER
LOCATION SIMPLE: RIGHT SCALP
LOCATION SIMPLE: SCALP
LOCATION SIMPLE: ABDOMEN
LOCATION SIMPLE: NECK
LOCATION SIMPLE: RIGHT SHOULDER
LOCATION SIMPLE: LEFT FOREARM

## 2023-09-06 ENCOUNTER — TELEPHONE (OUTPATIENT)
Dept: ONCOLOGY | Age: 63
End: 2023-09-06

## 2023-09-06 DIAGNOSIS — R23.2 HOT FLASHES: ICD-10-CM

## 2023-09-06 NOTE — TELEPHONE ENCOUNTER
Physician provider: Shane Nettles MD  Reason for today's call: Medication refill  Last office visit: n/a    Patient notified that their information will be routed to the [facility] clinical triage team for review. Patient is advised that they will receive a phone call from the triage department. If symptoms worsen before receiving a call back, the patient has been advised to proceed to the nearest ED. Pt called asking to have a 90 day Rx for: Veozah to be sent to: [facility] in  on [address] [facility] to last her until next OV. Pt stated sample showed positive results.

## 2023-09-06 NOTE — TELEPHONE ENCOUNTER
Medication Requested: Ena Flores    Date last prescribed: 8/7/23    Requested Pharmacy: [address] [place] [address]    Action Taken: Medication refilled with disp: #90. LVM informing patient refill sent.

## 2023-10-25 ENCOUNTER — OFFICE VISIT (OUTPATIENT)
Dept: ORTHOPEDIC SURGERY | Age: 63
End: 2023-10-25

## 2023-10-25 DIAGNOSIS — M76.829 PTTD (POSTERIOR TIBIAL TENDON DYSFUNCTION): ICD-10-CM

## 2023-10-25 DIAGNOSIS — Q74.2 PAIN ASSOCIATED WITH ACCESSORY NAVICULAR BONE OF FOOT, RIGHT: ICD-10-CM

## 2023-10-25 DIAGNOSIS — M79.671 PAIN ASSOCIATED WITH ACCESSORY NAVICULAR BONE OF FOOT, RIGHT: ICD-10-CM

## 2023-10-25 DIAGNOSIS — M76.821 POSTERIOR TIBIAL TENDINITIS OF RIGHT LOWER EXTREMITY: ICD-10-CM

## 2023-10-25 DIAGNOSIS — M79.671 RIGHT FOOT PAIN: Primary | ICD-10-CM

## 2023-10-25 RX ORDER — CITALOPRAM 20 MG/1
20 TABLET ORAL EVERY EVENING
COMMUNITY
Start: 2023-09-21

## 2023-10-25 RX ORDER — AMITRIPTYLINE HYDROCHLORIDE 10 MG/1
10 TABLET, FILM COATED ORAL NIGHTLY PRN
COMMUNITY
Start: 2023-09-21

## 2023-10-25 NOTE — PROGRESS NOTES
Name: Leandro Miranda  YOB: 1960  Gender: female  MRN: [id number]    CC: Right foot pain    HPI:   Early summer 2023: Right medial foot pain: No trauma  10/25/2023: Initial visit: Right medial foot pain    ROS/Meds/PSH/PMH/FH/SH: reviewed today    Tobacco:  reports that she has never smoked. She has never used smokeless tobacco.     Physical Examination:  Patient appears to be alert and oriented with acceptable appearance. No obvious distress or SOB  CV: appears to have acceptable vascular color and capillary refill  Neuro: appears to have mostly intact light touch sensation   Skin: Right medial foot soft tissue thickening  MS: Standing: Pes planovalgus: Limited heel rise gait  Right = insertional PTT/accessory navicular pain  Right = 4/5 PTT strength    XR: Right: Standing AP lateral mortise ankle plus AP oblique foot taken today with os trigonum; moderate hindfoot arthritis; planovalgus; accessory navicular  XR Impression:  As above       Injection: No indication    Assessment:    Right accessory navicular syndrome, insertional posterior tibial tendinitis, posterior tibial tendon deficiency    Plan:   The patient and I discussed the above assessment. We explored treatment options. In essence she has accessory navicular synchondrosis/PTT pain with 1 grade loss PTTD  Plan is immobilization in PT and if no resolution, MRI scan and surgery    Advanced medical imaging: Right ankle/hindfoot MRI scan: Assess accessory navicular synchondrosis/insertional PTT tearing: Future    DME: Lace up ankle brace: Incredible wear sleeve [offered 3D]  We discussed care and brace protection  PT: Prescribed at [facility]  Orthotic/prosthetic: Custom insoles needed from [facility] depending on her response       Medication - OTC meds prn: Her choice is OTC  Surgical discussion: She understands my upper extremity debilities.   Understands surgical procedure:  Right PTT/accessory navicular/[facility]

## 2023-10-25 NOTE — PROGRESS NOTES
The patient was prescribed a Wraptor brace for the patient's rightfoot. The patient wears a size NA shoe and I fitted the patient with a M brace. I explained how to fit the brace properly by pulling the lace tabs across top of foot first then under arch and lastly pulling the strap up firmly and attaching to the lateral Velcro strip. Thus forming a figure 8 across the ankle joint. Once the figure 8 is completed they are to secure the top (short circumferential) straps to help avoid the straps from loosening with normal wear. The patient was able to demonstrate proper fitting in office to ensure compliance with device and acknowledged satisfaction with current fit. The patient was prescribed and fitted with an ankle sleeve for the right foot, size S/M. Patient read and signed documenting they understand and agree to [facility]'s current DME return policy.

## 2023-11-15 ENCOUNTER — TELEPHONE (OUTPATIENT)
Dept: ONCOLOGY | Age: 63
End: 2023-11-15

## 2023-11-29 ENCOUNTER — OFFICE VISIT (OUTPATIENT)
Dept: ORTHOPEDIC SURGERY | Age: 63
End: 2023-11-29
Payer: COMMERCIAL

## 2023-11-29 DIAGNOSIS — M76.821 POSTERIOR TIBIAL TENDINITIS OF RIGHT LOWER EXTREMITY: Primary | ICD-10-CM

## 2023-11-29 PROCEDURE — 99213 OFFICE O/P EST LOW 20 MIN: CPT | Performed by: ORTHOPAEDIC SURGERY

## 2023-11-29 NOTE — PROGRESS NOTES
Name: Marly Rendon  YOB: 1960  Gender: female  MRN: [id number]    11/29/2023: She is not pain-free. Completed PT. Has PT insoles. HPI:   Early summer 2023: Right medial foot pain: No trauma  10/25/2023: Initial visit: Right medial foot pain    ROS/Meds/PSH/PMH/FH/SH: reviewed today    Tobacco:  reports that she has never smoked. She has never used smokeless tobacco.     Physical Examination:  Patient appears to be alert and oriented with acceptable appearance. No obvious distress or SOB  CV: appears to have acceptable vascular color and capillary refill  Neuro: appears to have mostly intact light touch sensation   Skin: Right medial foot soft tissue thickening  MS: Standing: Pes planovalgus: Gait full  Right = mild insertional PTT/accessory navicular pain to palpation but not resistive testing   Right = 5/5 PTT strength    XR: Right: Standing AP lateral mortise ankle plus AP oblique foot taken 10/25/2023 with os trigonum; moderate hindfoot arthritis; planovalgus; accessory navicular  XR Impression:  As above       Injection: No indication    Assessment:    Right accessory navicular syndrome, insertional posterior tibial tendinitis  Right resolved posterior tibial tendon deficiency    Plan:   The patient and I discussed the above assessment. We explored treatment options.      Thankfully, she resolved the posterior tibial tendon deficiency  In essence she has accessory navicular synchondrosis/PTT pain without PTTD  She has no PTT resistive pain and only has pain to palpation     She is comfortable watching and protecting and if anything changes, she understands MRI scan and surgery  Plan is her over-the-counter insoles and lace up ankle brace for any prolonged standing walking    Advanced medical imaging: Right ankle/hindfoot MRI scan: Assess accessory navicular synchondrosis/insertional PTT tearing: If needed    PT: Prescribed at [facility] - completed  Orthotic/prosthetic: She has

## 2024-01-31 DIAGNOSIS — C50.911 MALIGNANT NEOPLASM OF RIGHT BREAST IN FEMALE, ESTROGEN RECEPTOR POSITIVE, UNSPECIFIED SITE OF BREAST (HCC): Primary | ICD-10-CM

## 2024-01-31 DIAGNOSIS — Z17.0 MALIGNANT NEOPLASM OF RIGHT BREAST IN FEMALE, ESTROGEN RECEPTOR POSITIVE, UNSPECIFIED SITE OF BREAST (HCC): Primary | ICD-10-CM

## 2024-01-31 NOTE — PROGRESS NOTES
[facility] Hematology and Oncology: Established patient - follow up     Chief Complaint   Patient presents with    Follow-up     Reason for Referral: Breast cancer  Referring Provider:  Macarena Alfred MD  Family History of Cancer/Hematologic Disorders: None noted   Presenting Symptoms: abnormal routine screening mammogram    History of Present Illness:  Ms. Armstrong is a 63 y.o. female who presents today for follow up regarding breast cancer.  The past medical history is significant for anxiety, endocrine disorder, headache and menopause.  Sxhx: hysterectomy and breast biopsy.  In July 2022, Ms. Armstrong presented for her routine screening mammogram. The imaging reported a right breast mass. On 8/11/22 she had a right breast US that reported a 5 mm solid mass in the breast. On 8/16/22 she underwent a core needle biopsy of the mass. The pathology reported the right breast mass as infiltrating ductal carcinoma, low grade (well differentiated). Definite in situ component and lymphovascular invasion were not identified. Her IHC staining reported ER (97%) and [place] (93%) as positive and HER-2 (+1) as negative.  A referral was placed to medical oncology for next steps in the plan of care for her newly diagnosed right breast cancer. She has an appointment for surgical consult with Dr Martinez on 9/8/22.  never used tobacco products. She denies use of alcohol or drug substances.  - At consultation, we discussed the pathophysiology of breast cancer, staging, and the importance of receptor status in terms of treatment options.  We then reviewed her medical history as well as oncologic history, recent imaging and pathology in detail.  We discussed next steps in management.  After sx, we discussed her OncotypDx results that showed low risk of recurrent disease w chemotherapy benefit of <1%.    Letrozole started in Jan 2023.    - FU on letrozole.  She was tolerating it well except for hot flashes.  Rx'ed Veozah - newly FDA approved med

## 2024-02-06 ENCOUNTER — HOSPITAL ENCOUNTER (OUTPATIENT)
Dept: LAB | Age: 64
Discharge: HOME | End: 2024-02-09
Payer: COMMERCIAL

## 2024-02-06 ENCOUNTER — OFFICE VISIT (OUTPATIENT)
Dept: ONCOLOGY | Age: 64
End: 2024-02-06
Payer: COMMERCIAL

## 2024-02-06 VITALS
HEIGHT: 64 IN | WEIGHT: 122.5 LBS | OXYGEN SATURATION: 100 % | TEMPERATURE: 98.2 F | RESPIRATION RATE: 16 BRPM | DIASTOLIC BLOOD PRESSURE: 83 MMHG | SYSTOLIC BLOOD PRESSURE: 127 MMHG | BODY MASS INDEX: 20.92 KG/M2 | HEART RATE: 74 BPM

## 2024-02-06 DIAGNOSIS — Z17.0 MALIGNANT NEOPLASM OF RIGHT BREAST IN FEMALE, ESTROGEN RECEPTOR POSITIVE, UNSPECIFIED SITE OF BREAST (HCC): ICD-10-CM

## 2024-02-06 DIAGNOSIS — Z17.0 MALIGNANT NEOPLASM OF RIGHT BREAST IN FEMALE, ESTROGEN RECEPTOR POSITIVE, UNSPECIFIED SITE OF BREAST (HCC): Primary | ICD-10-CM

## 2024-02-06 DIAGNOSIS — Z79.811 AROMATASE INHIBITOR USE: ICD-10-CM

## 2024-02-06 DIAGNOSIS — C50.911 MALIGNANT NEOPLASM OF RIGHT BREAST IN FEMALE, ESTROGEN RECEPTOR POSITIVE, UNSPECIFIED SITE OF BREAST (HCC): Primary | ICD-10-CM

## 2024-02-06 DIAGNOSIS — C50.911 MALIGNANT NEOPLASM OF RIGHT BREAST IN FEMALE, ESTROGEN RECEPTOR POSITIVE, UNSPECIFIED SITE OF BREAST (HCC): ICD-10-CM

## 2024-02-06 DIAGNOSIS — Z12.31 ENCOUNTER FOR SCREENING MAMMOGRAM FOR MALIGNANT NEOPLASM OF BREAST: ICD-10-CM

## 2024-02-06 LAB
ALBUMIN SERPL-MCNC: 3.9 G/DL (ref 3.2–4.6)
ALBUMIN/GLOB SERPL: 1.3 (ref 0.4–1.6)
ALP SERPL-CCNC: 62 U/L (ref 50–136)
ALT SERPL-CCNC: 24 U/L (ref 12–65)
ANION GAP SERPL CALC-SCNC: 4 MMOL/L (ref 2–11)
AST SERPL-CCNC: 19 U/L (ref 15–37)
BASOPHILS # BLD: 0 K/UL (ref 0–0.2)
BASOPHILS NFR BLD: 0 % (ref 0–2)
BILIRUB SERPL-MCNC: 0.3 MG/DL (ref 0.2–1.1)
BUN SERPL-MCNC: 19 MG/DL (ref 8–23)
CALCIUM SERPL-MCNC: 9.5 MG/DL (ref 8.3–10.4)
CHLORIDE SERPL-SCNC: 107 MMOL/L (ref 103–113)
CO2 SERPL-SCNC: 29 MMOL/L (ref 21–32)
CREAT SERPL-MCNC: 0.8 MG/DL (ref 0.6–1)
DIFFERENTIAL METHOD BLD: ABNORMAL
EOSINOPHIL # BLD: 0.1 K/UL (ref 0–0.8)
EOSINOPHIL NFR BLD: 2 % (ref 0.5–7.8)
ERYTHROCYTE [DISTWIDTH] IN BLOOD BY AUTOMATED COUNT: 12.9 % (ref 11.9–14.6)
GLOBULIN SER CALC-MCNC: 3.1 G/DL (ref 2.8–4.5)
GLUCOSE SERPL-MCNC: 92 MG/DL (ref 65–100)
HCT VFR BLD AUTO: 39.8 % (ref 35.8–46.3)
HGB BLD-MCNC: 13 G/DL (ref 11.7–15.4)
IMM GRANULOCYTES # BLD AUTO: 0 K/UL (ref 0–0.5)
IMM GRANULOCYTES NFR BLD AUTO: 0 % (ref 0–5)
LYMPHOCYTES # BLD: 0.9 K/UL (ref 0.5–4.6)
LYMPHOCYTES NFR BLD: 13 % (ref 13–44)
MCH RBC QN AUTO: 30 PG (ref 26.1–32.9)
MCHC RBC AUTO-ENTMCNC: 32.7 G/DL (ref 31.4–35)
MCV RBC AUTO: 91.9 FL (ref 82–102)
MONOCYTES # BLD: 0.3 K/UL (ref 0.1–1.3)
MONOCYTES NFR BLD: 5 % (ref 4–12)
NEUTS SEG # BLD: 5.6 K/UL (ref 1.7–8.2)
NEUTS SEG NFR BLD: 80 % (ref 43–78)
NRBC # BLD: 0 K/UL (ref 0–0.2)
PLATELET # BLD AUTO: 270 K/UL (ref 150–450)
PMV BLD AUTO: 9.7 FL (ref 9.4–12.3)
POTASSIUM SERPL-SCNC: 4.4 MMOL/L (ref 3.5–5.1)
PROT SERPL-MCNC: 7 G/DL (ref 6.3–8.2)
RBC # BLD AUTO: 4.33 M/UL (ref 4.05–5.2)
SODIUM SERPL-SCNC: 140 MMOL/L (ref 136–146)
WBC # BLD AUTO: 7 K/UL (ref 4.3–11.1)

## 2024-02-06 PROCEDURE — 80053 COMPREHEN METABOLIC PANEL: CPT

## 2024-02-06 PROCEDURE — 99214 OFFICE O/P EST MOD 30 MIN: CPT | Performed by: INTERNAL MEDICINE

## 2024-02-06 PROCEDURE — 36415 COLL VENOUS BLD VENIPUNCTURE: CPT

## 2024-02-06 PROCEDURE — 85025 COMPLETE CBC W/AUTO DIFF WBC: CPT

## 2024-02-06 RX ORDER — LETROZOLE 2.5 MG/1
2.5 TABLET, FILM COATED ORAL DAILY
Qty: 90 TABLET | Refills: 3 | Status: SHIPPED | OUTPATIENT
Start: 2024-02-06

## 2024-02-06 NOTE — PATIENT INSTRUCTIONS
information explained on the after visit summary printout at the check-out desk.    FLIP NICOLE RN

## 2024-02-13 PROBLEM — Z12.31 ENCOUNTER FOR SCREENING MAMMOGRAM FOR MALIGNANT NEOPLASM OF BREAST: Status: ACTIVE | Noted: 2024-02-13

## 2024-03-14 PROBLEM — Z12.31 ENCOUNTER FOR SCREENING MAMMOGRAM FOR MALIGNANT NEOPLASM OF BREAST: Status: RESOLVED | Noted: 2024-02-13 | Resolved: 2024-03-14

## 2024-04-10 NOTE — PROGRESS NOTES
S/p right wire loc partial mast + SLN 9/23    No c/o- minimal soreness. Minimal, resolving bruising    Exam:  RIGHT Breast w/o hematoma or signfiicant seroma  Minimal dependent resolving ecchymosis  Axilla unremarkable    Path 3mm residual IDC. SLN x 1 and non-SLN x 1 NEGATIVE    (Original CNB had IDC max 7mm on slide)    Doing well s/p BCT for T1bN0 IDC    Unrestricted activity  Keep f/u with Dr Tu Fournier  Will refer to rad onc    Follow-up and Dispositions    Return for as needed. Walked patient to bathroom with walker and contact assist and patient tolerated well. Once back to bed patient was fidgeting with objects in the room such as computer and scanner, but patient is easily reoriented and pleasantly confused.

## 2024-08-30 ENCOUNTER — TELEPHONE (OUTPATIENT)
Dept: ONCOLOGY | Age: 64
End: 2024-08-30

## 2024-08-30 DIAGNOSIS — Z17.0 MALIGNANT NEOPLASM OF RIGHT BREAST IN FEMALE, ESTROGEN RECEPTOR POSITIVE, UNSPECIFIED SITE OF BREAST (HCC): Primary | ICD-10-CM

## 2024-08-30 DIAGNOSIS — C50.911 MALIGNANT NEOPLASM OF RIGHT BREAST IN FEMALE, ESTROGEN RECEPTOR POSITIVE, UNSPECIFIED SITE OF BREAST (HCC): Primary | ICD-10-CM

## 2024-09-03 ENCOUNTER — HOSPITAL ENCOUNTER (OUTPATIENT)
Dept: MAMMOGRAPHY | Age: 64
Discharge: HOME | End: 2024-09-06
Attending: INTERNAL MEDICINE
Payer: COMMERCIAL

## 2024-09-03 VITALS — WEIGHT: 120 LBS | BODY MASS INDEX: 21.26 KG/M2 | HEIGHT: 63 IN

## 2024-09-03 DIAGNOSIS — Z17.0 MALIGNANT NEOPLASM OF RIGHT BREAST IN FEMALE, ESTROGEN RECEPTOR POSITIVE, UNSPECIFIED SITE OF BREAST (HCC): ICD-10-CM

## 2024-09-03 DIAGNOSIS — C50.911 MALIGNANT NEOPLASM OF RIGHT BREAST IN FEMALE, ESTROGEN RECEPTOR POSITIVE, UNSPECIFIED SITE OF BREAST (HCC): ICD-10-CM

## 2024-09-03 DIAGNOSIS — Z12.31 ENCOUNTER FOR SCREENING MAMMOGRAM FOR MALIGNANT NEOPLASM OF BREAST: ICD-10-CM

## 2024-09-03 PROCEDURE — 77063 BREAST TOMOSYNTHESIS BI: CPT

## 2024-09-05 ENCOUNTER — OFFICE VISIT (OUTPATIENT)
Dept: ONCOLOGY | Age: 64
End: 2024-09-05
Payer: COMMERCIAL

## 2024-09-05 ENCOUNTER — HOSPITAL ENCOUNTER (OUTPATIENT)
Dept: LAB | Age: 64
Discharge: HOME | End: 2024-09-08
Payer: COMMERCIAL

## 2024-09-05 VITALS
HEART RATE: 81 BPM | RESPIRATION RATE: 16 BRPM | BODY MASS INDEX: 20.66 KG/M2 | OXYGEN SATURATION: 100 % | SYSTOLIC BLOOD PRESSURE: 118 MMHG | DIASTOLIC BLOOD PRESSURE: 67 MMHG | WEIGHT: 121 LBS | TEMPERATURE: 98.1 F | HEIGHT: 64 IN

## 2024-09-05 DIAGNOSIS — Z79.811 AROMATASE INHIBITOR USE: ICD-10-CM

## 2024-09-05 DIAGNOSIS — C50.911 MALIGNANT NEOPLASM OF RIGHT BREAST IN FEMALE, ESTROGEN RECEPTOR POSITIVE, UNSPECIFIED SITE OF BREAST (HCC): ICD-10-CM

## 2024-09-05 DIAGNOSIS — Z17.0 MALIGNANT NEOPLASM OF RIGHT BREAST IN FEMALE, ESTROGEN RECEPTOR POSITIVE, UNSPECIFIED SITE OF BREAST (HCC): Primary | ICD-10-CM

## 2024-09-05 DIAGNOSIS — C50.911 MALIGNANT NEOPLASM OF RIGHT BREAST IN FEMALE, ESTROGEN RECEPTOR POSITIVE, UNSPECIFIED SITE OF BREAST (HCC): Primary | ICD-10-CM

## 2024-09-05 DIAGNOSIS — Z17.0 MALIGNANT NEOPLASM OF RIGHT BREAST IN FEMALE, ESTROGEN RECEPTOR POSITIVE, UNSPECIFIED SITE OF BREAST (HCC): ICD-10-CM

## 2024-09-05 LAB
ALBUMIN SERPL-MCNC: 4 G/DL (ref 3.2–4.6)
ALBUMIN/GLOB SERPL: 1.6 (ref 1–1.9)
ALP SERPL-CCNC: 60 U/L (ref 35–104)
ALT SERPL-CCNC: 16 U/L (ref 12–65)
ANION GAP SERPL CALC-SCNC: 11 MMOL/L (ref 9–18)
AST SERPL-CCNC: 22 U/L (ref 15–37)
BASOPHILS # BLD: 0.1 K/UL (ref 0–0.2)
BASOPHILS NFR BLD: 1 % (ref 0–2)
BILIRUB SERPL-MCNC: <0.2 MG/DL (ref 0–1.2)
BUN SERPL-MCNC: 23 MG/DL (ref 8–23)
CALCIUM SERPL-MCNC: 9.7 MG/DL (ref 8.8–10.2)
CHLORIDE SERPL-SCNC: 104 MMOL/L (ref 98–107)
CO2 SERPL-SCNC: 27 MMOL/L (ref 20–28)
CREAT SERPL-MCNC: 0.7 MG/DL (ref 0.6–1.1)
DIFFERENTIAL METHOD BLD: NORMAL
EOSINOPHIL # BLD: 0.2 K/UL (ref 0–0.8)
EOSINOPHIL NFR BLD: 4 % (ref 0.5–7.8)
ERYTHROCYTE [DISTWIDTH] IN BLOOD BY AUTOMATED COUNT: 12.5 % (ref 11.9–14.6)
GLOBULIN SER CALC-MCNC: 2.5 G/DL (ref 2.3–3.5)
GLUCOSE SERPL-MCNC: 91 MG/DL (ref 70–99)
HCT VFR BLD AUTO: 40.1 % (ref 35.8–46.3)
HGB BLD-MCNC: 13.4 G/DL (ref 11.7–15.4)
IMM GRANULOCYTES # BLD AUTO: 0 K/UL (ref 0–0.5)
IMM GRANULOCYTES NFR BLD AUTO: 0 % (ref 0–5)
LYMPHOCYTES # BLD: 1 K/UL (ref 0.5–4.6)
LYMPHOCYTES NFR BLD: 24 % (ref 13–44)
MCH RBC QN AUTO: 30.7 PG (ref 26.1–32.9)
MCHC RBC AUTO-ENTMCNC: 33.4 G/DL (ref 31.4–35)
MCV RBC AUTO: 92 FL (ref 82–102)
MONOCYTES # BLD: 0.3 K/UL (ref 0.1–1.3)
MONOCYTES NFR BLD: 7 % (ref 4–12)
NEUTS SEG # BLD: 2.7 K/UL (ref 1.7–8.2)
NEUTS SEG NFR BLD: 63 % (ref 43–78)
NRBC # BLD: 0 K/UL (ref 0–0.2)
PLATELET # BLD AUTO: 247 K/UL (ref 150–450)
PMV BLD AUTO: 9.7 FL (ref 9.4–12.3)
POTASSIUM SERPL-SCNC: 5.1 MMOL/L (ref 3.5–5.1)
PROT SERPL-MCNC: 6.5 G/DL (ref 6.3–8.2)
RBC # BLD AUTO: 4.36 M/UL (ref 4.05–5.2)
SODIUM SERPL-SCNC: 142 MMOL/L (ref 136–145)
WBC # BLD AUTO: 4.3 K/UL (ref 4.3–11.1)

## 2024-09-05 PROCEDURE — 80053 COMPREHEN METABOLIC PANEL: CPT

## 2024-09-05 PROCEDURE — 36415 COLL VENOUS BLD VENIPUNCTURE: CPT

## 2024-09-05 PROCEDURE — 99213 OFFICE O/P EST LOW 20 MIN: CPT

## 2024-09-05 PROCEDURE — 85025 COMPLETE CBC W/AUTO DIFF WBC: CPT

## 2024-09-05 RX ORDER — LETROZOLE 2.5 MG/1
2.5 TABLET, FILM COATED ORAL DAILY
Qty: 90 TABLET | Refills: 3 | Status: SHIPPED | OUTPATIENT
Start: 2024-09-05

## 2024-09-05 ASSESSMENT — PATIENT HEALTH QUESTIONNAIRE - PHQ9
SUM OF ALL RESPONSES TO PHQ QUESTIONS 1-9: 0
SUM OF ALL RESPONSES TO PHQ9 QUESTIONS 1 & 2: 0
SUM OF ALL RESPONSES TO PHQ QUESTIONS 1-9: 0
1. LITTLE INTEREST OR PLEASURE IN DOING THINGS: NOT AT ALL
2. FEELING DOWN, DEPRESSED OR HOPELESS: NOT AT ALL

## 2024-09-10 ASSESSMENT — ENCOUNTER SYMPTOMS
WHEEZING: 0
CONSTIPATION: 0
TROUBLE SWALLOWING: 0
ABDOMINAL PAIN: 0
ABDOMINAL DISTENTION: 0
NAUSEA: 0
CHEST TIGHTNESS: 0
SHORTNESS OF BREATH: 0
SORE THROAT: 0
BLOOD IN STOOL: 0
HEMOPTYSIS: 0
DIARRHEA: 0
VOMITING: 0
SCLERAL ICTERUS: 0
VOICE CHANGE: 0

## 2024-11-05 ENCOUNTER — APPOINTMENT (RX ONLY)
Dept: URBAN - METROPOLITAN AREA CLINIC 330 | Facility: CLINIC | Age: 64
Setting detail: DERMATOLOGY
End: 2024-11-05

## 2024-11-05 DIAGNOSIS — L81.8 OTHER SPECIFIED DISORDERS OF PIGMENTATION: ICD-10-CM

## 2024-11-05 DIAGNOSIS — L57.8 OTHER SKIN CHANGES DUE TO CHRONIC EXPOSURE TO NONIONIZING RADIATION: ICD-10-CM

## 2024-11-05 DIAGNOSIS — L57.0 ACTINIC KERATOSIS: ICD-10-CM

## 2024-11-05 DIAGNOSIS — Z71.89 OTHER SPECIFIED COUNSELING: ICD-10-CM

## 2024-11-05 DIAGNOSIS — L82.1 OTHER SEBORRHEIC KERATOSIS: ICD-10-CM

## 2024-11-05 DIAGNOSIS — D22 MELANOCYTIC NEVI: ICD-10-CM

## 2024-11-05 DIAGNOSIS — L82.0 INFLAMED SEBORRHEIC KERATOSIS: ICD-10-CM

## 2024-11-05 PROBLEM — D22.5 MELANOCYTIC NEVI OF TRUNK: Status: ACTIVE | Noted: 2024-11-05

## 2024-11-05 PROCEDURE — 17110 DESTRUCTION B9 LES UP TO 14: CPT

## 2024-11-05 PROCEDURE — ? COUNSELING

## 2024-11-05 PROCEDURE — 99203 OFFICE O/P NEW LOW 30 MIN: CPT | Mod: 25

## 2024-11-05 PROCEDURE — ? FULL BODY SKIN EXAM

## 2024-11-05 PROCEDURE — 17000 DESTRUCT PREMALG LESION: CPT | Mod: 59

## 2024-11-05 PROCEDURE — ? OTHER

## 2024-11-05 PROCEDURE — ? TREATMENT REGIMEN

## 2024-11-05 PROCEDURE — ? PHOTO-DOCUMENTATION

## 2024-11-05 PROCEDURE — ? EDUCATIONAL RESOURCES PROVIDED

## 2024-11-05 PROCEDURE — ? LIQUID NITROGEN

## 2024-11-05 ASSESSMENT — LOCATION DETAILED DESCRIPTION DERM
LOCATION DETAILED: MIDDLE STERNUM
LOCATION DETAILED: LEFT CENTRAL PARIETAL SCALP
LOCATION DETAILED: INFERIOR THORACIC SPINE
LOCATION DETAILED: RIGHT LATERAL SUPERIOR CHEST
LOCATION DETAILED: LEFT RIB CAGE
LOCATION DETAILED: RIGHT MID-UPPER BACK
LOCATION DETAILED: RIGHT INFERIOR ANTERIOR NECK
LOCATION DETAILED: SUPERIOR THORACIC SPINE
LOCATION DETAILED: RIGHT SUPERIOR PARIETAL SCALP

## 2024-11-05 ASSESSMENT — LOCATION SIMPLE DESCRIPTION DERM
LOCATION SIMPLE: CHEST
LOCATION SIMPLE: UPPER BACK
LOCATION SIMPLE: ABDOMEN
LOCATION SIMPLE: RIGHT ANTERIOR NECK
LOCATION SIMPLE: RIGHT UPPER BACK
LOCATION SIMPLE: SCALP

## 2024-11-05 ASSESSMENT — LOCATION ZONE DERM
LOCATION ZONE: TRUNK
LOCATION ZONE: NECK
LOCATION ZONE: SCALP

## 2024-11-05 NOTE — PROCEDURE: LIQUID NITROGEN
Detail Level: Detailed
Show Aperture Variable?: Yes
Render Note In Bullet Format When Appropriate: No
Spray Paint Text: The liquid nitrogen was applied to the skin utilizing a spray paint frosting technique.
Medical Necessity Information: It is in your best interest to select a reason for this procedure from the list below. All of these items fulfill various CMS LCD requirements except the new and changing color options.
Duration Of Freeze Thaw-Cycle (Seconds): 5-10
Medical Necessity Clause: This procedure was medically necessary because the lesions that were treated were:
Number Of Freeze-Thaw Cycles: 2 freeze-thaw cycles
Post-Care Instructions: I reviewed with the patient in detail post-care instructions. Patient is to wear sunprotection, and avoid picking at any of the treated lesions. Pt may apply Vaseline to crusted or scabbing areas.
Consent: The patient's consent was obtained including but not limited to risks of crusting, scabbing, blistering, scarring, darker or lighter pigmentary change, recurrence, incomplete removal and infection.
Application Tool (Optional): Liquid Nitrogen Sprayer
Aperture Size (Optional): C
Number Of Freeze-Thaw Cycles: 3 freeze-thaw cycles
Duration Of Freeze Thaw-Cycle (Seconds): 2

## 2025-02-18 DIAGNOSIS — C50.911 MALIGNANT NEOPLASM OF RIGHT BREAST IN FEMALE, ESTROGEN RECEPTOR POSITIVE, UNSPECIFIED SITE OF BREAST (HCC): Primary | ICD-10-CM

## 2025-02-18 DIAGNOSIS — Z17.0 MALIGNANT NEOPLASM OF RIGHT BREAST IN FEMALE, ESTROGEN RECEPTOR POSITIVE, UNSPECIFIED SITE OF BREAST (HCC): Primary | ICD-10-CM

## 2025-02-18 DIAGNOSIS — E55.9 VITAMIN D DEFICIENCY: ICD-10-CM

## 2025-02-18 NOTE — PROGRESS NOTES
Gatherings with Friends and Family: Not asked   Intimate Partner Violence: Unknown (9/20/2023)    Received from [facility]    Intimate Partner Violence     Fear of Current or Ex-Partner: Not asked     Emotionally Abused: Not asked     Physically Abused: Not asked     Sexually Abused: Not asked   Housing Stability: Not At Risk (9/14/2022)    Received from [facility]    Housing Stability     Was there a time when you did not have a steady place to sleep: No     Worried that the place you are staying is making you sick: No      Review of Systems   Constitutional:  Negative for appetite change, chills, diaphoresis, fatigue, fever and unexpected weight change.   HENT:   Negative for hearing loss, mouth sores, nosebleeds, sore throat, trouble swallowing and voice change.    Eyes:  Negative for icterus.   Respiratory:  Negative for chest tightness, hemoptysis, shortness of breath and wheezing.    Cardiovascular:  Negative for chest pain, leg swelling and palpitations.   Gastrointestinal:  Negative for abdominal distention, abdominal pain, blood in stool, constipation, diarrhea, nausea and vomiting.   Endocrine: Positive for hot flashes.   Genitourinary:  Negative for difficulty urinating, frequency, vaginal bleeding and vaginal discharge.    Musculoskeletal:  Negative for arthralgias, flank pain, gait problem and myalgias.   Skin:  Negative for itching, rash and wound.   Neurological:  Negative for dizziness, extremity weakness, gait problem, headaches and numbness.   Psychiatric/Behavioral:  Positive for depression. Negative for confusion. The patient is not nervous/anxious.       No Known Allergies  Past Medical History:   Diagnosis Date    Anxiety     Breast cancer (HCC)     right    Difficult intubation     Endocrine disorder     Headache     Menopause     PONV (postoperative nausea and vomiting)      Past Surgical History:   Procedure Laterality Date    BREAST BIOPSY Right 9/23/2022

## 2025-02-21 ENCOUNTER — HOSPITAL ENCOUNTER (OUTPATIENT)
Dept: LAB | Age: 65
Discharge: HOME | End: 2025-02-21
Payer: COMMERCIAL

## 2025-02-21 ENCOUNTER — OFFICE VISIT (OUTPATIENT)
Dept: ONCOLOGY | Age: 65
End: 2025-02-21
Payer: COMMERCIAL

## 2025-02-21 VITALS
HEART RATE: 94 BPM | RESPIRATION RATE: 16 BRPM | BODY MASS INDEX: 20.83 KG/M2 | DIASTOLIC BLOOD PRESSURE: 79 MMHG | WEIGHT: 122 LBS | TEMPERATURE: 98.6 F | OXYGEN SATURATION: 95 % | HEIGHT: 64 IN | SYSTOLIC BLOOD PRESSURE: 131 MMHG

## 2025-02-21 DIAGNOSIS — E55.9 VITAMIN D DEFICIENCY: ICD-10-CM

## 2025-02-21 DIAGNOSIS — Z17.0 MALIGNANT NEOPLASM OF RIGHT BREAST IN FEMALE, ESTROGEN RECEPTOR POSITIVE, UNSPECIFIED SITE OF BREAST (HCC): Primary | ICD-10-CM

## 2025-02-21 DIAGNOSIS — C50.911 MALIGNANT NEOPLASM OF RIGHT BREAST IN FEMALE, ESTROGEN RECEPTOR POSITIVE, UNSPECIFIED SITE OF BREAST (HCC): Primary | ICD-10-CM

## 2025-02-21 DIAGNOSIS — Z79.811 AROMATASE INHIBITOR USE: ICD-10-CM

## 2025-02-21 DIAGNOSIS — Z91.89 AT RISK FOR BONE DENSITY LOSS: ICD-10-CM

## 2025-02-21 DIAGNOSIS — R23.2 HOT FLASHES: ICD-10-CM

## 2025-02-21 DIAGNOSIS — C50.911 MALIGNANT NEOPLASM OF RIGHT BREAST IN FEMALE, ESTROGEN RECEPTOR POSITIVE, UNSPECIFIED SITE OF BREAST (HCC): ICD-10-CM

## 2025-02-21 DIAGNOSIS — Z17.0 MALIGNANT NEOPLASM OF RIGHT BREAST IN FEMALE, ESTROGEN RECEPTOR POSITIVE, UNSPECIFIED SITE OF BREAST (HCC): ICD-10-CM

## 2025-02-21 LAB
25(OH)D3 SERPL-MCNC: 37.6 NG/ML (ref 30–100)
ALBUMIN SERPL-MCNC: 4 G/DL (ref 3.2–4.6)
ALBUMIN/GLOB SERPL: 1.4 (ref 1–1.9)
ALP SERPL-CCNC: 56 U/L (ref 35–104)
ALT SERPL-CCNC: 13 U/L (ref 8–45)
ANION GAP SERPL CALC-SCNC: 12 MMOL/L (ref 7–16)
AST SERPL-CCNC: 20 U/L (ref 15–37)
BASOPHILS # BLD: 0.04 K/UL (ref 0–0.2)
BASOPHILS NFR BLD: 0.6 % (ref 0–2)
BILIRUB SERPL-MCNC: <0.2 MG/DL (ref 0–1.2)
BUN SERPL-MCNC: 25 MG/DL (ref 8–23)
CALCIUM SERPL-MCNC: 9.5 MG/DL (ref 8.8–10.2)
CHLORIDE SERPL-SCNC: 102 MMOL/L (ref 98–107)
CO2 SERPL-SCNC: 25 MMOL/L (ref 20–29)
CREAT SERPL-MCNC: 0.82 MG/DL (ref 0.6–1.1)
DIFFERENTIAL METHOD BLD: ABNORMAL
EOSINOPHIL # BLD: 0.11 K/UL (ref 0–0.8)
EOSINOPHIL NFR BLD: 1.6 % (ref 0.5–7.8)
ERYTHROCYTE [DISTWIDTH] IN BLOOD BY AUTOMATED COUNT: 12.7 % (ref 11.9–14.6)
GLOBULIN SER CALC-MCNC: 2.8 G/DL (ref 2.3–3.5)
GLUCOSE SERPL-MCNC: 120 MG/DL (ref 70–99)
HCT VFR BLD AUTO: 38.8 % (ref 35.8–46.3)
HGB BLD-MCNC: 12.8 G/DL (ref 11.7–15.4)
IMM GRANULOCYTES # BLD AUTO: 0.02 K/UL (ref 0–0.5)
IMM GRANULOCYTES NFR BLD AUTO: 0.3 % (ref 0–5)
LYMPHOCYTES # BLD: 0.85 K/UL (ref 0.5–4.6)
LYMPHOCYTES NFR BLD: 12.7 % (ref 13–44)
MCH RBC QN AUTO: 30.5 PG (ref 26.1–32.9)
MCHC RBC AUTO-ENTMCNC: 33 G/DL (ref 31.4–35)
MCV RBC AUTO: 92.6 FL (ref 82–102)
MONOCYTES # BLD: 0.36 K/UL (ref 0.1–1.3)
MONOCYTES NFR BLD: 5.4 % (ref 4–12)
NEUTS SEG # BLD: 5.3 K/UL (ref 1.7–8.2)
NEUTS SEG NFR BLD: 79.4 % (ref 43–78)
NRBC # BLD: 0 K/UL (ref 0–0.2)
PLATELET # BLD AUTO: 264 K/UL (ref 150–450)
PMV BLD AUTO: 9.5 FL (ref 9.4–12.3)
POTASSIUM SERPL-SCNC: 4.4 MMOL/L (ref 3.5–5.1)
PROT SERPL-MCNC: 6.8 G/DL (ref 6.3–8.2)
RBC # BLD AUTO: 4.19 M/UL (ref 4.05–5.2)
SODIUM SERPL-SCNC: 139 MMOL/L (ref 136–145)
WBC # BLD AUTO: 6.7 K/UL (ref 4.3–11.1)

## 2025-02-21 PROCEDURE — 82306 VITAMIN D 25 HYDROXY: CPT

## 2025-02-21 PROCEDURE — 85025 COMPLETE CBC W/AUTO DIFF WBC: CPT

## 2025-02-21 PROCEDURE — 80053 COMPREHEN METABOLIC PANEL: CPT

## 2025-02-21 PROCEDURE — 99214 OFFICE O/P EST MOD 30 MIN: CPT | Performed by: INTERNAL MEDICINE

## 2025-02-21 PROCEDURE — 36415 COLL VENOUS BLD VENIPUNCTURE: CPT

## 2025-02-21 ASSESSMENT — PATIENT HEALTH QUESTIONNAIRE - PHQ9
SUM OF ALL RESPONSES TO PHQ QUESTIONS 1-9: 0
1. LITTLE INTEREST OR PLEASURE IN DOING THINGS: NOT AT ALL
2. FEELING DOWN, DEPRESSED OR HOPELESS: NOT AT ALL
SUM OF ALL RESPONSES TO PHQ QUESTIONS 1-9: 0

## 2025-02-21 NOTE — PATIENT INSTRUCTIONS
02/21/2025 0.36  0.10 - 1.30 K/UL Final    Eosinophils Absolute 02/21/2025 0.11  0.00 - 0.80 K/UL Final    Basophils Absolute 02/21/2025 0.04  0.00 - 0.20 K/UL Final    Immature Granulocytes Absolute 02/21/2025 0.02  0.00 - 0.50 K/UL Final    Differential Type 02/21/2025 AUTOMATED    Final    Sodium 02/21/2025 139  136 - 145 mmol/L Final    Potassium 02/21/2025 4.4  3.5 - 5.1 mmol/L Final    Chloride 02/21/2025 102  98 - 107 mmol/L Final    CO2 02/21/2025 25  20 - 29 mmol/L Final    Anion Gap 02/21/2025 12  7 - 16 mmol/L Final    Glucose 02/21/2025 120 (H)  70 - 99 mg/dL Final    Comment: <70 mg/dL Consistent with, but not fully diagnostic of hypoglycemia.  100 - 125 mg/dL Impaired fasting glucose/consistent with pre-diabetes mellitus.  > 126 mg/dl Fasting glucose consistent with overt diabetes mellitus      BUN 02/21/2025 25 (H)  8 - 23 MG/DL Final    Creatinine 02/21/2025 0.82  0.60 - 1.10 MG/DL Final    Est, Glom Filt Rate 02/21/2025 80  >60 ml/min/1.73m2 Final    Comment:    Pediatric calculator link: https://www.kidney.org/professionals/kdoqi/gfr_calculatorped     These results are not intended for use in patients <18 years of age.     eGFR results are calculated without a race factor using  the 2021 CKD-EPI equation. Careful clinical correlation is recommended, particularly when comparing to results calculated using previous equations.  The CKD-EPI equation is less accurate in patients with extremes of muscle mass, extra-renal metabolism of creatinine, excessive creatine ingestion, or following therapy that affects renal tubular secretion.      Calcium 02/21/2025 9.5  8.8 - 10.2 MG/DL Final    Total Bilirubin 02/21/2025 <0.2  0.0 - 1.2 MG/DL Final    ALT 02/21/2025 13  8 - 45 U/L Final    AST 02/21/2025 20  15 - 37 U/L Final    Alk Phosphatase 02/21/2025 56  35 - 104 U/L Final    Total Protein 02/21/2025 6.8  6.3 - 8.2 g/dL Final    Albumin 02/21/2025 4.0  3.2 - 4.6 g/dL Final    Globulin 02/21/2025 2.8  2.3 -

## 2025-03-04 PROBLEM — Z91.89 AT RISK FOR BONE DENSITY LOSS: Status: ACTIVE | Noted: 2025-03-04

## 2025-03-04 PROBLEM — E55.9 VITAMIN D DEFICIENCY: Status: ACTIVE | Noted: 2025-03-04

## 2025-03-25 DIAGNOSIS — Z17.0 MALIGNANT NEOPLASM OF RIGHT BREAST IN FEMALE, ESTROGEN RECEPTOR POSITIVE, UNSPECIFIED SITE OF BREAST: ICD-10-CM

## 2025-03-25 DIAGNOSIS — C50.911 MALIGNANT NEOPLASM OF RIGHT BREAST IN FEMALE, ESTROGEN RECEPTOR POSITIVE, UNSPECIFIED SITE OF BREAST: ICD-10-CM

## 2025-03-25 RX ORDER — LETROZOLE 2.5 MG/1
2.5 TABLET, FILM COATED ORAL DAILY
Qty: 90 TABLET | Refills: 3 | Status: SHIPPED | OUTPATIENT
Start: 2025-03-25

## 2025-05-14 ENCOUNTER — HOSPITAL ENCOUNTER (OUTPATIENT)
Dept: MAMMOGRAPHY | Age: 65
Discharge: HOME | End: 2025-05-17
Payer: MEDICARE

## 2025-05-14 ENCOUNTER — HOSPITAL ENCOUNTER (OUTPATIENT)
Dept: MRI IMAGING | Age: 65
Discharge: HOME | End: 2025-05-17
Payer: MEDICARE

## 2025-05-14 DIAGNOSIS — Z79.811 AROMATASE INHIBITOR USE: ICD-10-CM

## 2025-05-14 DIAGNOSIS — C50.911 MALIGNANT NEOPLASM OF RIGHT BREAST IN FEMALE, ESTROGEN RECEPTOR POSITIVE, UNSPECIFIED SITE OF BREAST (HCC): ICD-10-CM

## 2025-05-14 DIAGNOSIS — Z17.0 MALIGNANT NEOPLASM OF RIGHT BREAST IN FEMALE, ESTROGEN RECEPTOR POSITIVE, UNSPECIFIED SITE OF BREAST (HCC): ICD-10-CM

## 2025-05-14 PROCEDURE — 6360000004 HC RX CONTRAST MEDICATION

## 2025-05-14 PROCEDURE — A9579 GAD-BASE MR CONTRAST NOS,1ML: HCPCS

## 2025-05-14 PROCEDURE — C8908 MRI W/O FOL W/CONT, BREAST,: HCPCS

## 2025-05-14 PROCEDURE — 77080 DXA BONE DENSITY AXIAL: CPT

## 2025-05-14 RX ADMIN — GADOTERIDOL 10 ML: 279.3 INJECTION, SOLUTION INTRAVENOUS at 16:07

## 2025-05-19 ENCOUNTER — RESULTS FOLLOW-UP (OUTPATIENT)
Dept: PULMONOLOGY | Age: 65
End: 2025-05-19

## 2025-06-26 ENCOUNTER — HOSPITAL ENCOUNTER (OUTPATIENT)
Dept: CT IMAGING | Age: 65
Discharge: HOME | End: 2025-06-29
Attending: INTERNAL MEDICINE
Payer: MEDICARE

## 2025-06-26 DIAGNOSIS — R91.8 NONSPECIFIC ABNORMAL FINDINGS ON IMAGING OF LUNG: ICD-10-CM

## 2025-06-26 DIAGNOSIS — R93.89 ABNORMAL MRI: ICD-10-CM

## 2025-06-26 LAB — CREAT BLD-MCNC: 0.87 MG/DL (ref 0.8–1.5)

## 2025-06-26 PROCEDURE — 82565 ASSAY OF CREATININE: CPT

## 2025-06-26 PROCEDURE — 6360000004 HC RX CONTRAST MEDICATION: Performed by: INTERNAL MEDICINE

## 2025-06-26 PROCEDURE — 71260 CT THORAX DX C+: CPT

## 2025-06-26 RX ORDER — IOPAMIDOL 755 MG/ML
100 INJECTION, SOLUTION INTRAVASCULAR
Status: COMPLETED | OUTPATIENT
Start: 2025-06-26 | End: 2025-06-26

## 2025-06-26 RX ADMIN — IOPAMIDOL 100 ML: 755 INJECTION, SOLUTION INTRAVENOUS at 14:39

## 2025-07-01 DIAGNOSIS — Z17.0 MALIGNANT NEOPLASM OF LOWER-INNER QUADRANT OF RIGHT BREAST OF FEMALE, ESTROGEN RECEPTOR POSITIVE (HCC): ICD-10-CM

## 2025-07-01 DIAGNOSIS — R93.89 ABNORMAL CT OF THE CHEST: Primary | ICD-10-CM

## 2025-07-01 DIAGNOSIS — R93.89 NODULAR RADIOLOGIC DENSITY: ICD-10-CM

## 2025-07-01 DIAGNOSIS — C50.311 MALIGNANT NEOPLASM OF LOWER-INNER QUADRANT OF RIGHT BREAST OF FEMALE, ESTROGEN RECEPTOR POSITIVE (HCC): ICD-10-CM

## 2025-07-02 ENCOUNTER — TELEPHONE (OUTPATIENT)
Dept: RADIATION ONCOLOGY | Age: 65
End: 2025-07-02

## 2025-07-02 NOTE — PROGRESS NOTES
PET scan re-signed per protocol with new dx code due to ABN trigger.    Verbal order for PET received from Dr. Lovett with verbal read back to confirm. Order signed and routed to provider for co signature.

## 2025-07-02 NOTE — TELEPHONE ENCOUNTER
More specific dx code entered and radiology scheduling notified. ABN cleared. Okay for patient to schedule.

## 2025-07-02 NOTE — PROGRESS NOTES
[facility] Hematology and Oncology: Established patient - follow up     Chief Complaint   Patient presents with    Follow-up     Reason for Referral: Breast cancer  Referring Provider:  Macarena Alfred MD  Family History of Cancer/Hematologic Disorders: None noted   Presenting Symptoms: abnormal routine screening mammogram    History of Present Illness:  Ms. Armstrong is a 65 y.o. female who presents today for follow up regarding breast cancer.  The past medical history is significant for anxiety, endocrine disorder, headache and menopause.  Sxhx: hysterectomy and breast biopsy.  In July 2022, Ms. Armstrong presented for her routine screening mammogram. The imaging reported a right breast mass. On 8/11/22 she had a right breast US that reported a 5 mm solid mass in the breast. On 8/16/22 she underwent a core needle biopsy of the mass. The pathology reported the right breast mass as infiltrating ductal carcinoma, low grade (well differentiated). Definite in situ component and lymphovascular invasion were not identified. Her IHC staining reported ER (97%) and [place] (93%) as positive and HER-2 (+1) as negative.  A referral was placed to medical oncology for next steps in the plan of care for her newly diagnosed right breast cancer. She has an appointment for surgical consult with Dr Martinez on 9/8/22.  never used tobacco products. She denies use of alcohol or drug substances.  - At consultation, we discussed the pathophysiology of breast cancer, staging, and the importance of receptor status in terms of treatment options.  We then reviewed her medical history as well as oncologic history, recent imaging and pathology in detail.  We discussed next steps in management.  After sx, we discussed her OncotypDx results that showed low risk of recurrent disease w chemotherapy benefit of <1%.    Letrozole started in Jan 2023.    - FU on letrozole.  She was tolerating it well except for hot flashes.  Rx'ed Veozah - newly FDA approved med

## 2025-07-02 NOTE — TELEPHONE ENCOUNTER
Physician provider: Dr. Lovett  Reason for today's call (Please detail here patients chief complaint): Avani with auth dept called to say the PET scan is popping up with an ABN so the dx code needs to be corrected. Please call her back for clarification  [phone] follow prompt for preaccess auth dept.   Last office visit:2/21/25  Patient Callback Number: n/a  Was callback number verified?: Yes  Preferred pharmacy (If refill request): n/a  Veriified that patient confirmed no refills left at pharmacy? :No  Has the patient called the office for this concern within the last 48 hours?:No    Red Word Mentioned  Warm Transfer Phone Call to (Name): n/a    Patient notified that their information will be routed to the appropriate team for review. Patient is advised that they will receive a phone call from the appropriate department. If awaiting a call from the triage department and symptoms worsen before receiving a call back, the patient has been advised to proceed to the nearest ED.

## 2025-07-09 ENCOUNTER — HOSPITAL ENCOUNTER (OUTPATIENT)
Dept: PET IMAGING | Age: 65
Discharge: HOME | End: 2025-07-12
Payer: MEDICARE

## 2025-07-09 DIAGNOSIS — C50.311 MALIGNANT NEOPLASM OF LOWER-INNER QUADRANT OF RIGHT BREAST OF FEMALE, ESTROGEN RECEPTOR POSITIVE (HCC): ICD-10-CM

## 2025-07-09 DIAGNOSIS — R93.89 ABNORMAL CT OF THE CHEST: ICD-10-CM

## 2025-07-09 DIAGNOSIS — Z17.0 MALIGNANT NEOPLASM OF LOWER-INNER QUADRANT OF RIGHT BREAST OF FEMALE, ESTROGEN RECEPTOR POSITIVE (HCC): ICD-10-CM

## 2025-07-09 DIAGNOSIS — R93.89 NODULAR RADIOLOGIC DENSITY: ICD-10-CM

## 2025-07-09 LAB
GLUCOSE BLD STRIP.AUTO-MCNC: 92 MG/DL (ref 65–100)
SERVICE CMNT-IMP: NORMAL

## 2025-07-09 PROCEDURE — 2500000003 HC RX 250 WO HCPCS: Performed by: INTERNAL MEDICINE

## 2025-07-09 PROCEDURE — 82962 GLUCOSE BLOOD TEST: CPT

## 2025-07-09 PROCEDURE — 78815 PET IMAGE W/CT SKULL-THIGH: CPT

## 2025-07-09 PROCEDURE — 6360000004 HC RX CONTRAST MEDICATION: Performed by: INTERNAL MEDICINE

## 2025-07-09 PROCEDURE — A9609 HC RX DIAGNOSTIC RADIOPHARMACEUTICAL: HCPCS | Performed by: INTERNAL MEDICINE

## 2025-07-09 PROCEDURE — 3430000000 HC RX DIAGNOSTIC RADIOPHARMACEUTICAL: Performed by: INTERNAL MEDICINE

## 2025-07-09 RX ORDER — SODIUM CHLORIDE 0.9 % (FLUSH) 0.9 %
10 SYRINGE (ML) INJECTION AS NEEDED
Status: DISCONTINUED | OUTPATIENT
Start: 2025-07-09 | End: 2025-07-13 | Stop reason: HOSPADM

## 2025-07-09 RX ORDER — DIATRIZOATE MEGLUMINE AND DIATRIZOATE SODIUM 660; 100 MG/ML; MG/ML
10 SOLUTION ORAL; RECTAL
Status: DISCONTINUED | OUTPATIENT
Start: 2025-07-09 | End: 2025-07-13 | Stop reason: HOSPADM

## 2025-07-09 RX ORDER — FLUDEOXYGLUCOSE F 18 200 MCI/ML
10.64 INJECTION, SOLUTION INTRAVENOUS
Status: COMPLETED | OUTPATIENT
Start: 2025-07-09 | End: 2025-07-09

## 2025-07-09 RX ADMIN — DIATRIZOATE MEGLUMINE AND DIATRIZOATE SODIUM 10 ML: 660; 100 LIQUID ORAL; RECTAL at 08:11

## 2025-07-09 RX ADMIN — FLUDEOXYGLUCOSE F 18 10.64 MILLICURIE: 200 INJECTION, SOLUTION INTRAVENOUS at 08:11

## 2025-07-09 RX ADMIN — SODIUM CHLORIDE, PRESERVATIVE FREE 10 ML: 5 INJECTION INTRAVENOUS at 08:11

## 2025-07-16 ENCOUNTER — HOSPITAL ENCOUNTER (OUTPATIENT)
Dept: LAB | Age: 65
Discharge: HOME | End: 2025-07-16
Payer: MEDICARE

## 2025-07-16 ENCOUNTER — OFFICE VISIT (OUTPATIENT)
Dept: ONCOLOGY | Age: 65
End: 2025-07-16
Payer: MEDICARE

## 2025-07-16 VITALS
DIASTOLIC BLOOD PRESSURE: 74 MMHG | BODY MASS INDEX: 22.14 KG/M2 | SYSTOLIC BLOOD PRESSURE: 130 MMHG | TEMPERATURE: 98 F | RESPIRATION RATE: 17 BRPM | OXYGEN SATURATION: 98 % | HEIGHT: 62 IN | WEIGHT: 120.3 LBS | HEART RATE: 80 BPM

## 2025-07-16 DIAGNOSIS — C50.911 MALIGNANT NEOPLASM OF RIGHT BREAST IN FEMALE, ESTROGEN RECEPTOR POSITIVE, UNSPECIFIED SITE OF BREAST (HCC): ICD-10-CM

## 2025-07-16 DIAGNOSIS — Z17.0 MALIGNANT NEOPLASM OF RIGHT BREAST IN FEMALE, ESTROGEN RECEPTOR POSITIVE, UNSPECIFIED SITE OF BREAST (HCC): Primary | ICD-10-CM

## 2025-07-16 DIAGNOSIS — E55.9 VITAMIN D DEFICIENCY: ICD-10-CM

## 2025-07-16 DIAGNOSIS — Z91.89 AT RISK FOR BONE DENSITY LOSS: ICD-10-CM

## 2025-07-16 DIAGNOSIS — Z79.811 AROMATASE INHIBITOR USE: ICD-10-CM

## 2025-07-16 DIAGNOSIS — Z17.0 MALIGNANT NEOPLASM OF RIGHT BREAST IN FEMALE, ESTROGEN RECEPTOR POSITIVE, UNSPECIFIED SITE OF BREAST (HCC): ICD-10-CM

## 2025-07-16 DIAGNOSIS — C50.911 MALIGNANT NEOPLASM OF RIGHT BREAST IN FEMALE, ESTROGEN RECEPTOR POSITIVE, UNSPECIFIED SITE OF BREAST (HCC): Primary | ICD-10-CM

## 2025-07-16 DIAGNOSIS — R91.1 LUNG NODULE SEEN ON IMAGING STUDY: ICD-10-CM

## 2025-07-16 LAB
25(OH)D3 SERPL-MCNC: 69.2 NG/ML (ref 30–100)
ALBUMIN SERPL-MCNC: 4 G/DL (ref 3.2–4.6)
ALBUMIN/GLOB SERPL: 1.4 (ref 1–1.9)
ALP SERPL-CCNC: 58 U/L (ref 35–104)
ALT SERPL-CCNC: 13 U/L (ref 8–45)
ANION GAP SERPL CALC-SCNC: 12 MMOL/L (ref 7–16)
AST SERPL-CCNC: 21 U/L (ref 15–37)
BASOPHILS # BLD: 0.03 K/UL (ref 0–0.2)
BASOPHILS NFR BLD: 0.6 % (ref 0–2)
BILIRUB SERPL-MCNC: 0.3 MG/DL (ref 0–1.2)
BUN SERPL-MCNC: 18 MG/DL (ref 8–23)
CALCIUM SERPL-MCNC: 9.4 MG/DL (ref 8.8–10.2)
CHLORIDE SERPL-SCNC: 104 MMOL/L (ref 98–107)
CO2 SERPL-SCNC: 24 MMOL/L (ref 20–29)
CREAT SERPL-MCNC: 0.88 MG/DL (ref 0.6–1.1)
DIFFERENTIAL METHOD BLD: NORMAL
EOSINOPHIL # BLD: 0.07 K/UL (ref 0–0.8)
EOSINOPHIL NFR BLD: 1.5 % (ref 0.5–7.8)
ERYTHROCYTE [DISTWIDTH] IN BLOOD BY AUTOMATED COUNT: 12.6 % (ref 11.9–14.6)
GLOBULIN SER CALC-MCNC: 2.8 G/DL (ref 2.3–3.5)
GLUCOSE SERPL-MCNC: 101 MG/DL (ref 70–99)
HCT VFR BLD AUTO: 40.4 % (ref 35.8–46.3)
HGB BLD-MCNC: 13.1 G/DL (ref 11.7–15.4)
IMM GRANULOCYTES # BLD AUTO: 0.01 K/UL (ref 0–0.5)
IMM GRANULOCYTES NFR BLD AUTO: 0.2 % (ref 0–5)
LYMPHOCYTES # BLD: 1.07 K/UL (ref 0.5–4.6)
LYMPHOCYTES NFR BLD: 23.1 % (ref 13–44)
MCH RBC QN AUTO: 30 PG (ref 26.1–32.9)
MCHC RBC AUTO-ENTMCNC: 32.4 G/DL (ref 31.4–35)
MCV RBC AUTO: 92.4 FL (ref 82–102)
MONOCYTES # BLD: 0.33 K/UL (ref 0.1–1.3)
MONOCYTES NFR BLD: 7.1 % (ref 4–12)
NEUTS SEG # BLD: 3.13 K/UL (ref 1.7–8.2)
NEUTS SEG NFR BLD: 67.5 % (ref 43–78)
NRBC # BLD: 0 K/UL (ref 0–0.2)
PLATELET # BLD AUTO: 248 K/UL (ref 150–450)
PMV BLD AUTO: 9.8 FL (ref 9.4–12.3)
POTASSIUM SERPL-SCNC: 4.4 MMOL/L (ref 3.5–5.1)
PROT SERPL-MCNC: 6.8 G/DL (ref 6.3–8.2)
RBC # BLD AUTO: 4.37 M/UL (ref 4.05–5.2)
SODIUM SERPL-SCNC: 140 MMOL/L (ref 136–145)
WBC # BLD AUTO: 4.6 K/UL (ref 4.3–11.1)

## 2025-07-16 PROCEDURE — 82306 VITAMIN D 25 HYDROXY: CPT

## 2025-07-16 PROCEDURE — 85025 COMPLETE CBC W/AUTO DIFF WBC: CPT

## 2025-07-16 PROCEDURE — 80053 COMPREHEN METABOLIC PANEL: CPT

## 2025-07-16 PROCEDURE — 1123F ACP DISCUSS/DSCN MKR DOCD: CPT | Performed by: INTERNAL MEDICINE

## 2025-07-16 PROCEDURE — 99215 OFFICE O/P EST HI 40 MIN: CPT | Performed by: INTERNAL MEDICINE

## 2025-07-16 PROCEDURE — 36415 COLL VENOUS BLD VENIPUNCTURE: CPT

## 2025-07-16 ASSESSMENT — PATIENT HEALTH QUESTIONNAIRE - PHQ9
1. LITTLE INTEREST OR PLEASURE IN DOING THINGS: NOT AT ALL
SUM OF ALL RESPONSES TO PHQ QUESTIONS 1-9: 0
2. FEELING DOWN, DEPRESSED OR HOPELESS: NOT AT ALL

## 2025-07-16 NOTE — PATIENT INSTRUCTIONS
Patient Information from Today's Visit    The members of your [facility] are listed below:    Physician Provider: Marielena Lovett, Medical Oncologist  Advanced Practice Clinician: Ashley Tran NP  Registered Nurse: Andree PERSON RN  Nurse Navigator:   Medical Assistant: Ann STILES CMA  :Angie WELLS  Supportive Care Services: Lester BURRIS LMSW    Diagnosis (Information Sheet Provided on Day of Diagnosis): Breast    Follow Up Instructions:   -Labs were reviewed and discussed with the patient.  -PET Scan was reviewed with the patient.  -We have sent a referral to Tumor Board for our doctors to discuss your condition.  -Follow up in 3 months with a CT Scan prior.    Has Treatment Plan Been Finalized? Yes-Please see previous treatment plan documentaion    Current Labs:   Hospital Outpatient Visit on 07/16/2025   Component Date Value Ref Range Status    WBC 07/16/2025 4.6  4.3 - 11.1 K/uL Final    RBC 07/16/2025 4.37  4.05 - 5.2 M/uL Final    Hemoglobin 07/16/2025 13.1  11.7 - 15.4 g/dL Final    Hematocrit 07/16/2025 40.4  35.8 - 46.3 % Final    MCV 07/16/2025 92.4  82.0 - 102.0 FL Final    MCH 07/16/2025 30.0  26.1 - 32.9 PG Final    MCHC 07/16/2025 32.4  31.4 - 35.0 g/dL Final    RDW 07/16/2025 12.6  11.9 - 14.6 % Final    Platelets 07/16/2025 248  150 - 450 K/uL Final    MPV 07/16/2025 9.8  9.4 - 12.3 FL Final    nRBC 07/16/2025 0.00  0.0 - 0.2 K/uL Final    **Note: Absolute NRBC parameter is now reported with Hemogram**    Neutrophils % 07/16/2025 67.5  43.0 - 78.0 % Final    Lymphocytes % 07/16/2025 23.1  13.0 - 44.0 % Final    Monocytes % 07/16/2025 7.1  4.0 - 12.0 % Final    Eosinophils % 07/16/2025 1.5  0.5 - 7.8 % Final    Basophils % 07/16/2025 0.6  0.0 - 2.0 % Final    Immature Granulocytes % 07/16/2025 0.2  0.0 - 5.0 % Final    Neutrophils Absolute 07/16/2025 3.13  1.70 - 8.20 K/UL Final    Lymphocytes Absolute 07/16/2025 1.07  0.50 - 4.60 K/UL Final    Monocytes Absolute 07/16/2025 0.33

## 2025-07-21 PROBLEM — R91.1 LUNG NODULE SEEN ON IMAGING STUDY: Status: ACTIVE | Noted: 2025-07-21

## (undated) DEVICE — SUTURE VCRL SZ 4-0 L18IN ABSRB UD L19MM PS-2 3/8 CIR PRIM J496H

## (undated) DEVICE — SOLUTION IRRIG 1000ML 0.9% SOD CHL USP POUR PLAS BTL

## (undated) DEVICE — SYR 3ML LL TIP 1/10ML GRAD --

## (undated) DEVICE — COVER PRB L11.9CM TAPR L3.8X61CM TRNSPAR SFT PLIABLE

## (undated) DEVICE — MINOR SPLIT GENERAL: Brand: MEDLINE INDUSTRIES, INC.

## (undated) DEVICE — Device

## (undated) DEVICE — GARMENT,MEDLINE,DVT,INT,CALF,LG, GEN2: Brand: MEDLINE

## (undated) DEVICE — SUTURE PERMAHAND SZ 3-0 L30IN NONABSORBABLE BLK SH L26MM K832H

## (undated) DEVICE — MASTISOL ADHESIVE LIQ 2/3ML

## (undated) DEVICE — GLOVE SURG SZ 8 CRM LTX FREE POLYISOPRENE POLYMER BEAD ANTI

## (undated) DEVICE — NDL SPNE QNCKE 22GX5IN LF BLK --

## (undated) DEVICE — SYR 5ML 1/5 GRAD LL NSAF LF --

## (undated) DEVICE — APPLIER CLP L9.38IN M LIG TI DISP STR RNG HNDL LIGACLP

## (undated) DEVICE — NON-DEHP T-CONNECTOR EXTENSION SET, T HOUSING, ROTATING T-CONNECTOR: Brand: INTERLINK

## (undated) DEVICE — 3M™ TEGADERM™ TRANSPARENT FILM DRESSING FRAME STYLE, 1626W, 4 IN X 4-3/4 IN (10 CM X 12 CM), 50/CT 4CT/CASE: Brand: 3M™ TEGADERM™

## (undated) DEVICE — ILLUMINATOR ELECTROCAUTERY RETRACTED L8IN EXT L11IN DYN 10PK

## (undated) DEVICE — PAD,NON-ADHERENT,3X8,STERILE,LF,1/PK: Brand: MEDLINE

## (undated) DEVICE — TUBING, SUCTION, 3/16" X 10', STRAIGHT: Brand: MEDLINE

## (undated) DEVICE — CANISTER, RIGID, 2000CC: Brand: MEDLINE INDUSTRIES, INC.

## (undated) DEVICE — SUTURE VCRL SZ 3-0 L27IN ABSRB UD L26MM SH 1/2 CIR J416H

## (undated) DEVICE — STRIP,CLOSURE,WOUND,MEDI-STRIP,1/2X4: Brand: MEDLINE

## (undated) DEVICE — SPONGE GZ W6XL6IN COT 6 PLY SUP FLUF EXTRA ABSRB FOR PRE OP

## (undated) DEVICE — DRAPE TWL SURG 16X26IN BLU ORB04] ALLCARE INC]

## (undated) DEVICE — DRAPE,T,LAPARO,TRANS,STERILE: Brand: MEDLINE

## (undated) DEVICE — NEEDLE HYPO 21GA L1.5IN INTRAMUSCULAR S STL LATCH BVL UP